# Patient Record
Sex: MALE | Race: WHITE | NOT HISPANIC OR LATINO | Employment: OTHER | URBAN - METROPOLITAN AREA
[De-identification: names, ages, dates, MRNs, and addresses within clinical notes are randomized per-mention and may not be internally consistent; named-entity substitution may affect disease eponyms.]

---

## 2023-09-11 ENCOUNTER — OFFICE VISIT (OUTPATIENT)
Dept: INTERNAL MEDICINE | Facility: CLINIC | Age: 43
End: 2023-09-11
Payer: OTHER GOVERNMENT

## 2023-09-11 VITALS
HEART RATE: 88 BPM | OXYGEN SATURATION: 96 % | RESPIRATION RATE: 16 BRPM | SYSTOLIC BLOOD PRESSURE: 110 MMHG | HEIGHT: 73 IN | BODY MASS INDEX: 27.55 KG/M2 | DIASTOLIC BLOOD PRESSURE: 82 MMHG | WEIGHT: 207.88 LBS

## 2023-09-11 DIAGNOSIS — F51.01 PRIMARY INSOMNIA: ICD-10-CM

## 2023-09-11 DIAGNOSIS — Z76.89 ENCOUNTER TO ESTABLISH CARE: Primary | ICD-10-CM

## 2023-09-11 DIAGNOSIS — G89.29 CHRONIC CERVICAL PAIN: ICD-10-CM

## 2023-09-11 DIAGNOSIS — R21 RASH AND NONSPECIFIC SKIN ERUPTION: ICD-10-CM

## 2023-09-11 DIAGNOSIS — M54.2 CHRONIC CERVICAL PAIN: ICD-10-CM

## 2023-09-11 DIAGNOSIS — L64.9 MALE PATTERN BALDNESS: ICD-10-CM

## 2023-09-11 DIAGNOSIS — Z72.0 NICOTINE USE: ICD-10-CM

## 2023-09-11 PROCEDURE — 99999 PR PBB SHADOW E&M-NEW PATIENT-LVL III: ICD-10-PCS | Mod: PBBFAC,,, | Performed by: INTERNAL MEDICINE

## 2023-09-11 PROCEDURE — 99203 PR OFFICE/OUTPT VISIT, NEW, LEVL III, 30-44 MIN: ICD-10-PCS | Mod: S$PBB,,, | Performed by: INTERNAL MEDICINE

## 2023-09-11 PROCEDURE — 99203 OFFICE O/P NEW LOW 30 MIN: CPT | Mod: S$PBB,,, | Performed by: INTERNAL MEDICINE

## 2023-09-11 PROCEDURE — 99999 PR PBB SHADOW E&M-NEW PATIENT-LVL III: CPT | Mod: PBBFAC,,, | Performed by: INTERNAL MEDICINE

## 2023-09-11 PROCEDURE — 99203 OFFICE O/P NEW LOW 30 MIN: CPT | Mod: PBBFAC | Performed by: INTERNAL MEDICINE

## 2023-09-11 RX ORDER — TRAZODONE HYDROCHLORIDE 50 MG/1
50 TABLET ORAL NIGHTLY
Qty: 30 TABLET | Refills: 11 | Status: SHIPPED | OUTPATIENT
Start: 2023-09-11 | End: 2024-09-10

## 2023-09-11 RX ORDER — BUPROPION HYDROCHLORIDE 150 MG/1
150 TABLET, EXTENDED RELEASE ORAL 2 TIMES DAILY
Qty: 60 TABLET | Refills: 11 | Status: SHIPPED | OUTPATIENT
Start: 2023-09-11 | End: 2024-09-10

## 2023-09-11 RX ORDER — FINASTERIDE 5 MG/1
5 TABLET, FILM COATED ORAL DAILY
COMMUNITY
End: 2023-09-18 | Stop reason: SDUPTHER

## 2023-09-11 RX ORDER — KETOCONAZOLE 20 MG/ML
SHAMPOO, SUSPENSION TOPICAL
COMMUNITY
End: 2023-09-18 | Stop reason: SDUPTHER

## 2023-09-11 NOTE — PROGRESS NOTES
"Subjective:       Patient ID: Cirilo Blackman is a 42 y.o. male.    Chief Complaint: Establish Care      HPI:  Patient is new to clinic and presents to establish care and for medication refills.    Hair loss: finasteride. Has been taking for years. No med side effects.   Ketoconazole shampoo. Had spots on trunk and "bumps" on scalp. This prevents the rash for reoccurring. Has had this for several years but this treatment is effective.     MRI neck and left shoulder pain. He has pain with turning head to the right. He is s/p left shoulder surgery 1.5 years ago. Still has intermittent pain. He has continued weakness of the left shoulder. He has reduced ROM. Reattach bicep tendon---continued weakness was expected outcome after surgery however.     Not sleeping well. Tried OTC melatonin without much success.   He can fall asleep ok but difficulty staying asleep. Wakes up a few times a night. Overall sleeping 5-6 hours a night.     Tobacco use: e cig with nicotine; smoking 5-6 cig/day  EtOH use: denies daily use  Illicit drug use: denies use      History reviewed. No pertinent past medical history.    Family History   Problem Relation Age of Onset    Cancer Father         prostate    Hypertension Father        Social History     Socioeconomic History    Marital status: Single   Tobacco Use    Smoking status: Every Day     Types: Vaping with nicotine     Start date: 9/9/2023    Smokeless tobacco: Never   Substance and Sexual Activity    Alcohol use: Yes     Alcohol/week: 3.0 standard drinks of alcohol     Types: 3 Cans of beer per week    Drug use: Never       Review of Systems   Constitutional:  Negative for activity change, fatigue, fever and unexpected weight change.   HENT:  Negative for congestion, ear pain, hearing loss, rhinorrhea and sore throat.    Eyes:  Negative for redness and visual disturbance.   Respiratory:  Negative for cough, shortness of breath and wheezing.    Cardiovascular:  Negative for chest pain, " palpitations and leg swelling.   Gastrointestinal:  Negative for abdominal pain, constipation, diarrhea, nausea and vomiting.   Genitourinary:  Negative for decreased urine volume, dysuria, frequency and urgency.   Musculoskeletal:  Negative for back pain, joint swelling and neck pain.   Skin:  Negative for color change, rash and wound.   Neurological:  Negative for dizziness, tremors, weakness, light-headedness and headaches.   Psychiatric/Behavioral:  Positive for sleep disturbance.          Objective:      Physical Exam  Vitals reviewed.   Constitutional:       General: He is not in acute distress.     Appearance: He is well-developed.   HENT:      Head: Normocephalic and atraumatic.      Right Ear: External ear normal.      Left Ear: External ear normal.   Eyes:      General:         Right eye: No discharge.         Left eye: No discharge.      Conjunctiva/sclera: Conjunctivae normal.   Neck:      Thyroid: No thyromegaly.   Cardiovascular:      Rate and Rhythm: Normal rate and regular rhythm.      Heart sounds: No murmur heard.  Pulmonary:      Effort: Pulmonary effort is normal. No respiratory distress.      Breath sounds: Normal breath sounds. No wheezing or rales.   Abdominal:      General: There is no distension.      Palpations: Abdomen is soft.      Tenderness: There is no abdominal tenderness.   Skin:     General: Skin is warm and dry.   Neurological:      Mental Status: He is alert and oriented to person, place, and time. Mental status is at baseline.   Psychiatric:         Behavior: Behavior normal.         Thought Content: Thought content normal.         Assessment:       1. Encounter to establish care    2. Nicotine use    3. Chronic cervical pain    4. Male pattern baldness    5. Rash and nonspecific skin eruption    6. Primary insomnia        Plan:       1. Encounter to establish care  Meds reconciled  Problem list reviewed and udpated  PMH, PSH, SH and  reviewed    2. Nicotine use  Smoking  cessation counseling done  Has had asuccess with wellbutrin in the past  Side effects discussed  Trial 150mg BID  -     buPROPion (WELLBUTRIN SR) 150 MG TBSR 12 hr tablet; Take 1 tablet (150 mg total) by mouth 2 (two) times daily.  Dispense: 60 tablet; Refill: 11    3. Chronic cervical pain  Had MRI done in past, will obtain radiology report to review  PRN tyelnol, NSAIDs    4. Male pattern baldness  Chronic stable  Cont meds same dose    5. Rash and nonspecific skin eruption  Chronic stable  Unclear actual diagnosis but responds well to nizoral shampoo; ok to continue for now    6. Primary insomnia  New problem  Failed melatonin  Trial of low dose trazodone. Side effects discussed today  Avoid hypnotics/controlled substance in [employer]  -     traZODone (DESYREL) 50 MG tablet; Take 1 tablet (50 mg total) by mouth every evening.  Dispense: 30 tablet; Refill: 11       RTC 3 months follow up meds and PRN

## 2023-09-11 NOTE — LETTER
AUTHORIZATION FOR RELEASE OF   CONFIDENTIAL INFORMATION    Dear Isabell,    We are seeing Cirilo Blackman, date of birth 1980, in the clinic at [facility]. Lazara Tirado MD is the patient's PCP. Cirilo Blackman has an outstanding lab/procedure at the time we reviewed his chart. In order to help keep his health information updated, he has authorized us to request the following medical record(s):        (  )  MAMMOGRAM                                      (  )  COLONOSCOPY      (  )  PAP SMEAR                                          (  )  OUTSIDE LAB RESULTS     (  )  DEXA SCAN                                          (  )  EYE EXAM            (  )  FOOT EXAM                                          ( X )  MRI SHOULDER     (  )  OUTSIDE IMMUNIZATIONS                 ( X )  MRI NECK         Please fax records to [facility], Knight, Sarah, MD, [phone]     If you have any questions, please contact Amie Kendall at [phone].           Patient Name: Cirilo Blackman  : 1980  Patient Phone #: [phone]

## 2023-09-15 RX ORDER — KETOCONAZOLE 20 MG/ML
SHAMPOO, SUSPENSION TOPICAL
Status: CANCELLED | OUTPATIENT
Start: 2023-09-18

## 2023-09-15 NOTE — TELEPHONE ENCOUNTER
Pt recently [facility] is asking for an RX for ketoconazole shampoo please advise.

## 2023-09-18 RX ORDER — KETOCONAZOLE 20 MG/ML
SHAMPOO, SUSPENSION TOPICAL
Qty: 120 ML | Refills: 1 | Status: SHIPPED | OUTPATIENT
Start: 2023-09-18 | End: 2023-11-20

## 2023-09-18 RX ORDER — KETOCONAZOLE 20 MG/ML
SHAMPOO, SUSPENSION TOPICAL
Qty: 120 ML | Refills: 3 | Status: CANCELLED | OUTPATIENT
Start: 2023-09-18

## 2023-09-18 RX ORDER — FINASTERIDE 5 MG/1
5 TABLET, FILM COATED ORAL DAILY
Qty: 30 TABLET | Refills: 11 | Status: SHIPPED | OUTPATIENT
Start: 2023-09-18

## 2023-09-18 NOTE — TELEPHONE ENCOUNTER
No care due was identified.  [facility] Embedded Care Due Messages. Reference number: [id number].   9/18/2023 9:09:22 AM CDT

## 2023-09-18 NOTE — TELEPHONE ENCOUNTER
----- Message from Lashae Perez sent at 2023  7:18 AM CDT -----  Contact: pt  Cirilo Blackman  MRN: [id number]  : 1980  PCP: Lazara Tirado  Home Phone      Not on file.  Work Phone      Not on file.  Mobile          [phone]      MESSAGE:     Pt left vm stating he was supposed to have 3 meds sent in to [facility] and they only received 1. He stated he is out of ketoconazole (NIZORAL) 2 % shampoo, finasteride (PROSCAR) 5 mg tablet. Left msg on        Please advise   [phone]

## 2023-09-26 ENCOUNTER — OFFICE VISIT (OUTPATIENT)
Dept: INTERNAL MEDICINE | Facility: CLINIC | Age: 43
End: 2023-09-26
Payer: OTHER GOVERNMENT

## 2023-09-26 VITALS
RESPIRATION RATE: 16 BRPM | HEART RATE: 82 BPM | BODY MASS INDEX: 27.14 KG/M2 | OXYGEN SATURATION: 95 % | SYSTOLIC BLOOD PRESSURE: 122 MMHG | WEIGHT: 204.81 LBS | HEIGHT: 73 IN | DIASTOLIC BLOOD PRESSURE: 80 MMHG

## 2023-09-26 DIAGNOSIS — M54.42 ACUTE LEFT-SIDED LOW BACK PAIN WITH LEFT-SIDED SCIATICA: ICD-10-CM

## 2023-09-26 DIAGNOSIS — Z09 HOSPITAL DISCHARGE FOLLOW-UP: Primary | ICD-10-CM

## 2023-09-26 PROCEDURE — 99999PBSHW FLU VACCINE (QUAD) GREATER THAN OR EQUAL TO 3YO PRESERVATIVE FREE IM: Mod: PBBFAC,,,

## 2023-09-26 PROCEDURE — 99213 PR OFFICE/OUTPT VISIT, EST, LEVL III, 20-29 MIN: ICD-10-PCS | Mod: S$PBB,,, | Performed by: INTERNAL MEDICINE

## 2023-09-26 PROCEDURE — 90686 IIV4 VACC NO PRSV 0.5 ML IM: CPT | Mod: PBBFAC

## 2023-09-26 PROCEDURE — 99214 OFFICE O/P EST MOD 30 MIN: CPT | Mod: PBBFAC | Performed by: INTERNAL MEDICINE

## 2023-09-26 PROCEDURE — 99999PBSHW FLU VACCINE (QUAD) GREATER THAN OR EQUAL TO 3YO PRESERVATIVE FREE IM: ICD-10-PCS | Mod: PBBFAC,,,

## 2023-09-26 PROCEDURE — 99999 PR PBB SHADOW E&M-EST. PATIENT-LVL IV: ICD-10-PCS | Mod: PBBFAC,,, | Performed by: INTERNAL MEDICINE

## 2023-09-26 PROCEDURE — 99999 PR PBB SHADOW E&M-EST. PATIENT-LVL IV: CPT | Mod: PBBFAC,,, | Performed by: INTERNAL MEDICINE

## 2023-09-26 PROCEDURE — 99213 OFFICE O/P EST LOW 20 MIN: CPT | Mod: S$PBB,,, | Performed by: INTERNAL MEDICINE

## 2023-09-26 NOTE — PROGRESS NOTES
"Subjective:       Patient ID: Cirilo Blackman is a 42 y.o. male.    Chief Complaint: Hospital Follow Up      HPI:  Patient is known to me and presents for ER follow up. He reports he was doing some heavy lifting and strenuous work and developed stiff lower back. He went for a massage and felt a "lightening blot" sensation down the left leg. When he tried to stand he almost fell due to weakness and pain. Went to ER and xray was normal. Given muscle relaxer and pain meds. Over last 3 days gradually improved in pain and strength. Yesterday he went into pool again for exercise, stretching.       Has chiropractor appointment with [facility].  He is interested in PT however.       History reviewed. No pertinent past medical history.    Family History   Problem Relation Age of Onset    Cancer Father         prostate    Hypertension Father        Social History     Socioeconomic History    Marital status: Single   Tobacco Use    Smoking status: Every Day     Types: Vaping with nicotine     Start date: 9/9/2023    Smokeless tobacco: Never   Substance and Sexual Activity    Alcohol use: Yes     Alcohol/week: 3.0 standard drinks of alcohol     Types: 3 Cans of beer per week    Drug use: Never       Review of Systems   Constitutional:  Negative for activity change, fatigue, fever and unexpected weight change.   HENT:  Negative for congestion, ear pain, hearing loss, rhinorrhea and sore throat.    Eyes:  Negative for pain, redness and visual disturbance.   Respiratory:  Negative for cough, shortness of breath and wheezing.    Cardiovascular:  Negative for chest pain, palpitations and leg swelling.   Gastrointestinal:  Negative for abdominal pain, constipation, diarrhea, nausea and vomiting.   Genitourinary:  Negative for decreased urine volume, dysuria, frequency and urgency.   Musculoskeletal:  Positive for back pain. Negative for joint swelling and neck pain.   Skin:  Negative for color change, " rash and wound.   Neurological:  Positive for numbness (shooting pain into left leg). Negative for dizziness, tremors, weakness, light-headedness and headaches.         Objective:      Physical Exam  Vitals reviewed.   Constitutional:       General: He is not in acute distress.     Appearance: He is well-developed.   HENT:      Head: Normocephalic and atraumatic.      Right Ear: External ear normal.      Left Ear: External ear normal.   Eyes:      General:         Right eye: No discharge.         Left eye: No discharge.      Conjunctiva/sclera: Conjunctivae normal.   Neck:      Thyroid: No thyromegaly.   Cardiovascular:      Rate and Rhythm: Normal rate and regular rhythm.   Pulmonary:      Effort: Pulmonary effort is normal. No respiratory distress.   Skin:     General: Skin is warm and dry.   Neurological:      Mental Status: He is alert and oriented to person, place, and time.   Psychiatric:         Behavior: Behavior normal.         Thought Content: Thought content normal.         Assessment:       1. Hospital discharge follow-up    2. Acute left-sided low back pain with left-sided sciatica        Plan:       1. Hospital discharge follow-up  Outside ER records requested and pending review  Meds reconciled    2. Acute left-sided low back pain with left-sided sciatica  New problem  Start PT  PRN tyelnol, NSAIDs  Avoid heavy lifting  If no improvement with conservative treatment will do MRI  -     Ambulatory referral/consult to Physical/Occupational Therapy; Future; Expected date: 10/03/2023

## 2023-11-20 RX ORDER — KETOCONAZOLE 20 MG/ML
1 SHAMPOO, SUSPENSION TOPICAL 2 TIMES DAILY
Qty: 120 ML | Refills: 1 | Status: SHIPPED | OUTPATIENT
Start: 2023-11-20

## 2024-01-23 ENCOUNTER — TELEPHONE (OUTPATIENT)
Dept: INTERNAL MEDICINE | Facility: CLINIC | Age: 44
End: 2024-01-23
Payer: OTHER GOVERNMENT

## 2024-01-23 ENCOUNTER — PATIENT OUTREACH (OUTPATIENT)
Dept: ADMINISTRATIVE | Facility: HOSPITAL | Age: 44
End: 2024-01-23
Payer: OTHER GOVERNMENT

## 2024-01-23 DIAGNOSIS — Z11.59 NEED FOR HEPATITIS C SCREENING TEST: ICD-10-CM

## 2024-01-23 DIAGNOSIS — Z13.220 SCREENING FOR HYPERLIPIDEMIA: ICD-10-CM

## 2024-01-23 DIAGNOSIS — Z11.4 SCREENING FOR HIV (HUMAN IMMUNODEFICIENCY VIRUS): ICD-10-CM

## 2024-01-23 DIAGNOSIS — Z13.1 DIABETES MELLITUS SCREENING: Primary | ICD-10-CM

## 2024-01-23 NOTE — TELEPHONE ENCOUNTER
----- Message from Jane Joyce LPN sent at 1/23/2024  2:05 PM CST -----  Regarding: Lab orders  Spoke with patient, Lab appointment scheduled for 02/06/2024.    Please add additional Labs if needed.    Thank you,  Jane Joyce LPN

## 2024-01-23 NOTE — PROGRESS NOTES
Population Health Chart Review & Patient Outreach Details    Outreach Performed: YES Telephone Successful    Additional Pop Health Notes:    Contacted patient to discuss Colorectal cancer screening and scheduling Lab appointment.   Patient will  Fit kit from office.  Lab appointment scheduled for 2024.       Updates Requested / Reviewed:      Updated Care Coordination Note, Care Everywhere, , External Sources: [facility] and [facility] [facility], Care Team Updated, Removed  or Duplicate Orders, and Immunizations Reconciliation Completed or Queried: [place]         Health Maintenance Topics Overdue:    Health Maintenance Due   Topic Date Due    Hepatitis C Screening  Never done    Lipid Panel  Never done    Pneumococcal Vaccines (Age 0-64) (1 - PCV) Never done    HIV Screening  Never done    Hemoglobin A1c (Diabetic Prevention Screening)  Never done    COVID-19 Vaccine (2023-24 season) 2023    Colorectal Cancer Screening  Never done         Health Maintenance Topic(s) Outreach Outcomes & Actions Taken:    Colorectal Cancer Screening - Outreach Outcomes & Actions Taken  : patient will stop by clinic to  Fit Kit.     Lab(s) - Outreach Outcomes & Actions Taken  : Overdue Lab(s) Ordered and Overdue Lab(s) Scheduled  Lab appointment scheduled for 2024.  Lab orders placed and linked to appointment. Message sent to provider for additional labs to be order if needed.

## 2024-02-06 ENCOUNTER — LAB VISIT (OUTPATIENT)
Dept: LAB | Facility: HOSPITAL | Age: 44
End: 2024-02-06
Attending: INTERNAL MEDICINE
Payer: OTHER GOVERNMENT

## 2024-02-06 DIAGNOSIS — Z11.59 NEED FOR HEPATITIS C SCREENING TEST: ICD-10-CM

## 2024-02-06 DIAGNOSIS — Z13.220 SCREENING FOR HYPERLIPIDEMIA: ICD-10-CM

## 2024-02-06 DIAGNOSIS — Z11.4 SCREENING FOR HIV (HUMAN IMMUNODEFICIENCY VIRUS): ICD-10-CM

## 2024-02-06 DIAGNOSIS — Z13.1 DIABETES MELLITUS SCREENING: ICD-10-CM

## 2024-02-06 LAB
CHOLEST SERPL-MCNC: 197 MG/DL (ref 120–199)
CHOLEST/HDLC SERPL: 3.9 {RATIO} (ref 2–5)
HDLC SERPL-MCNC: 51 MG/DL (ref 40–75)
HDLC SERPL: 25.9 % (ref 20–50)
LDLC SERPL CALC-MCNC: 127 MG/DL (ref 63–159)
NONHDLC SERPL-MCNC: 146 MG/DL
TRIGL SERPL-MCNC: 95 MG/DL (ref 30–150)

## 2024-02-06 PROCEDURE — 83036 HEMOGLOBIN GLYCOSYLATED A1C: CPT | Performed by: INTERNAL MEDICINE

## 2024-02-06 PROCEDURE — 86803 HEPATITIS C AB TEST: CPT | Performed by: INTERNAL MEDICINE

## 2024-02-06 PROCEDURE — 87389 HIV-1 AG W/HIV-1&-2 AB AG IA: CPT | Performed by: INTERNAL MEDICINE

## 2024-02-06 PROCEDURE — 80061 LIPID PANEL: CPT | Performed by: INTERNAL MEDICINE

## 2024-02-06 PROCEDURE — 36415 COLL VENOUS BLD VENIPUNCTURE: CPT | Performed by: INTERNAL MEDICINE

## 2024-02-07 LAB
ESTIMATED AVG GLUCOSE: 114 MG/DL (ref 68–131)
HBA1C MFR BLD: 5.6 % (ref 4–5.6)
HCV AB SERPL QL IA: NORMAL
HIV 1+2 AB+HIV1 P24 AG SERPL QL IA: NORMAL

## 2024-07-24 ENCOUNTER — HOSPITAL ENCOUNTER (OUTPATIENT)
Dept: RADIOLOGY | Facility: HOSPITAL | Age: 44
Discharge: HOME | End: 2024-07-24
Attending: INTERNAL MEDICINE
Payer: OTHER GOVERNMENT

## 2024-07-24 ENCOUNTER — TELEPHONE (OUTPATIENT)
Dept: INTERNAL MEDICINE | Facility: CLINIC | Age: 44
End: 2024-07-24

## 2024-07-24 ENCOUNTER — OFFICE VISIT (OUTPATIENT)
Dept: INTERNAL MEDICINE | Facility: CLINIC | Age: 44
End: 2024-07-24
Payer: OTHER GOVERNMENT

## 2024-07-24 ENCOUNTER — PATIENT MESSAGE (OUTPATIENT)
Dept: INTERNAL MEDICINE | Facility: CLINIC | Age: 44
End: 2024-07-24

## 2024-07-24 VITALS
RESPIRATION RATE: 16 BRPM | HEIGHT: 73 IN | OXYGEN SATURATION: 96 % | BODY MASS INDEX: 28.63 KG/M2 | WEIGHT: 216.06 LBS | HEART RATE: 86 BPM | DIASTOLIC BLOOD PRESSURE: 88 MMHG | SYSTOLIC BLOOD PRESSURE: 130 MMHG

## 2024-07-24 DIAGNOSIS — F51.01 PRIMARY INSOMNIA: ICD-10-CM

## 2024-07-24 DIAGNOSIS — G89.29 CHRONIC BILATERAL LOW BACK PAIN WITHOUT SCIATICA: ICD-10-CM

## 2024-07-24 DIAGNOSIS — M25.552 CHRONIC PAIN OF BOTH HIPS: Primary | ICD-10-CM

## 2024-07-24 DIAGNOSIS — M54.50 CHRONIC BILATERAL LOW BACK PAIN WITHOUT SCIATICA: ICD-10-CM

## 2024-07-24 DIAGNOSIS — S46.212S BICEPS TENDON RUPTURE, LEFT, SEQUELA: ICD-10-CM

## 2024-07-24 DIAGNOSIS — M54.2 CHRONIC NECK PAIN: ICD-10-CM

## 2024-07-24 DIAGNOSIS — M25.551 CHRONIC PAIN OF BOTH HIPS: Primary | ICD-10-CM

## 2024-07-24 DIAGNOSIS — M77.10 LATERAL EPICONDYLITIS, UNSPECIFIED LATERALITY: ICD-10-CM

## 2024-07-24 DIAGNOSIS — G89.29 CHRONIC NECK PAIN: ICD-10-CM

## 2024-07-24 DIAGNOSIS — J34.2 DEVIATED SEPTUM: ICD-10-CM

## 2024-07-24 DIAGNOSIS — H93.13 TINNITUS OF BOTH EARS: ICD-10-CM

## 2024-07-24 DIAGNOSIS — F41.1 GAD (GENERALIZED ANXIETY DISORDER): ICD-10-CM

## 2024-07-24 DIAGNOSIS — Z02.89 ENCOUNTER FOR PHYSICAL EXAMINATION RELATED TO EMPLOYMENT: Primary | ICD-10-CM

## 2024-07-24 DIAGNOSIS — G89.29 CHRONIC PAIN OF BOTH HIPS: Primary | ICD-10-CM

## 2024-07-24 PROBLEM — R21 RASH AND NONSPECIFIC SKIN ERUPTION: Status: RESOLVED | Noted: 2023-09-11 | Resolved: 2024-07-24

## 2024-07-24 PROCEDURE — 72050 X-RAY EXAM NECK SPINE 4/5VWS: CPT | Mod: 26,,, | Performed by: RADIOLOGY

## 2024-07-24 PROCEDURE — G2211 COMPLEX E/M VISIT ADD ON: HCPCS | Mod: S$PBB,,, | Performed by: INTERNAL MEDICINE

## 2024-07-24 PROCEDURE — 72100 X-RAY EXAM L-S SPINE 2/3 VWS: CPT | Mod: TC

## 2024-07-24 PROCEDURE — 72050 X-RAY EXAM NECK SPINE 4/5VWS: CPT | Mod: TC

## 2024-07-24 PROCEDURE — 99215 OFFICE O/P EST HI 40 MIN: CPT | Mod: S$PBB,,, | Performed by: INTERNAL MEDICINE

## 2024-07-24 PROCEDURE — 99214 OFFICE O/P EST MOD 30 MIN: CPT | Mod: PBBFAC,25 | Performed by: INTERNAL MEDICINE

## 2024-07-24 PROCEDURE — 72100 X-RAY EXAM L-S SPINE 2/3 VWS: CPT | Mod: 26,,, | Performed by: RADIOLOGY

## 2024-07-24 PROCEDURE — 99999 PR PBB SHADOW E&M-EST. PATIENT-LVL IV: CPT | Mod: PBBFAC,,, | Performed by: INTERNAL MEDICINE

## 2024-07-24 NOTE — PROGRESS NOTES
Subjective:       Patient ID: Cirilo Blackman is a 43 y.o. male.    Chief Complaint: Employment Physical      HPI:  Patient is known to me and presents for employment physical. Due for [facility] labs and spirometry. He also has several acute issues he would like to discuss today.       He is having right arm lateral elbow pain. Has had pain for a few years. Getting worse. Lots of frequent rotational movements at his job that he thinks has exacerbated the pain. He has no swelling, rash, skin changes.         Chronic neck and left shoulder pain: Had MRI neck and left shoulder pain prior to establishing here. He has pain with turning head to the right. He is s/p left shoulder surgery 1.5 years ago. Still has intermittent pain. He has continued weakness of the left shoulder. He has reduced ROM. Reattach bicep tendon---continued weakness was expected outcome after surgery however.    He is seeing chiropractor currently with some relief of shoulder pain.   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    He also has b/l knee and hip pain. Worse with running. Better with rest.   Doing swimming for exercise as running/walking is painful.  Likely due to wear and tear from chronic physical exertion with work.       Not sleeping well. Tried OTC melatonin without much success.   He can fall asleep ok but difficulty staying asleep. Wakes up a few times a night. Overall sleeping 5-6 hours a night.   At last visit we started trazodone 50mg. He does find it is effective. He does not take every night.  He is still snoring. Snoring does not seem to be improved with position change (sitting up for example).  He does have obvious deviated septum; can not breath from right nostril.       He is also asking about counseling. He is having anxiety and depression sx. He relates this to leaving  [place] and anxiety of unknown future.  Denies SI/HI.   He is interested in counseling.     He lastly report b/l tinnitus. Has constant ringing senation in the ears. At this time, doesn't appreciate hearing loss. Loud noise exposure from .      Hair loss: finasteride. Has been taking for years. No med side effects.   Ketoconazole shampoo. Had spots on trunk and "bumps" on scalp. This prevents the rash for reoccurring. Has had this for several years but this treatment is effective.     Tobacco use: e cig with nicotine; smoking 5-6 cig/day  EtOH use: denies daily use  Illicit drug use: denies use         History reviewed. No pertinent past medical history.    Family History   Problem Relation Name Age of Onset    Cancer Father          prostate    Hypertension Father         Social History     Socioeconomic History    Marital status: Single   Tobacco Use    Smoking status: Every Day     Types: Vaping with nicotine     Start date: 9/9/2023    Smokeless tobacco: Never   Substance and Sexual Activity    Alcohol use: Yes     Alcohol/week: 3.0 standard drinks of alcohol     Types: 3 Cans of beer per week    Drug use: Never     Social Determinants of Health     Transportation Needs: No Transportation Needs (1/23/2024)    PRAPARE - Transportation     Lack of Transportation (Medical): No     Lack of Transportation (Non-Medical): No       Review of Systems   Constitutional:  Negative for activity change, fatigue, fever and unexpected weight change.   HENT:  Positive for tinnitus. Negative for congestion, ear pain, rhinorrhea and sore throat.    Eyes:  Negative for redness and visual disturbance.   Respiratory:  Negative for cough, shortness of breath and wheezing.    Cardiovascular:  Negative for chest pain, palpitations and leg swelling.   Gastrointestinal:  Negative for abdominal pain, constipation, diarrhea, nausea and vomiting.   Genitourinary:  Negative for dysuria and frequency.   Musculoskeletal:  Positive " for arthralgias, back pain and neck pain. Negative for joint swelling.   Skin:  Negative for color change, rash and wound.   Neurological:  Negative for dizziness, light-headedness and headaches.   Psychiatric/Behavioral:  Positive for sleep disturbance. The patient is nervous/anxious.          Objective:      Physical Exam  Vitals reviewed.   Constitutional:       General: He is not in acute distress.     Appearance: He is well-developed.   HENT:      Head: Normocephalic and atraumatic.      Right Ear: External ear normal.      Left Ear: External ear normal.      Nose:      Comments: Deviated septum  Eyes:      General:         Right eye: No discharge.         Left eye: No discharge.      Conjunctiva/sclera: Conjunctivae normal.   Neck:      Thyroid: No thyromegaly.   Cardiovascular:      Rate and Rhythm: Normal rate and regular rhythm.      Heart sounds: No murmur heard.  Pulmonary:      Effort: Pulmonary effort is normal. No respiratory distress.      Breath sounds: Normal breath sounds.   Abdominal:      General: There is no distension.      Palpations: Abdomen is soft.      Tenderness: There is no abdominal tenderness.   Musculoskeletal:         General: Tenderness (right latearl epicondyle; pain wtih supination/pronation) and deformity (right 5th digit with mild deformity after possible injury) present.   Skin:     General: Skin is warm and dry.   Neurological:      Mental Status: He is alert and oriented to person, place, and time.   Psychiatric:         Behavior: Behavior normal.         Thought Content: Thought content normal.         Assessment:       1. Encounter for physical examination related to employment    2. MINOO (generalized anxiety disorder)    3. Deviated septum    4. Lateral epicondylitis, unspecified laterality    5. Biceps tendon rupture, left, sequela    6. Tinnitus of both ears    7. Chronic neck pain    8. Chronic bilateral low back pain without sciatica    9. Primary insomnia        Plan:        1. Encounter for physical examination related to employment  Labs and PFTs ordered per [facility] physical  Return for completion of forms when all tseting complete  -     CBC Auto Differential; Future; Expected date: 07/24/2024  -     Comprehensive Metabolic Panel; Future; Expected date: 07/24/2024  -     TSH; Future; Expected date: 07/24/2024  -     Lipid Panel; Future; Expected date: 07/24/2024  -     T4, Free; Future; Expected date: 07/24/2024  -     Hemoglobin A1C; Future; Expected date: 07/24/2024  -     Urinalysis; Future; Expected date: 07/24/2024  -     Complete PFT with bronchodilator; Future  -     LACTATE DEHYDROGENASE; Future; Expected date: 07/24/2024    2. MINOO (generalized anxiety disorder)  Chronic uncontrolled  Would like to start therapy, agree with patient request  -     Ambulatory referral/consult to Psychology; Future; Expected date: 07/31/2024    3. Deviated septum  Chronic, not improving  + snoring  + difficulty breathing  To ENT to discuss treatmen toptions  -     Ambulatory referral/consult to ENT; Future; Expected date: 07/31/2024    4. Lateral epicondylitis, unspecified laterality  Acute  Rest when able   PRN tyelnol, NSAIDs    5. Biceps tendon rupture, left, sequela  History of  Continued weakness post operatively  Recurrent use with work  PRN tyelnol, nsaids    6. Tinnitus of both ears  New problem  To ENT for hearing testing  -     Ambulatory referral/consult to ENT; Future; Expected date: 07/31/2024    7. Chronic neck pain  Chronic, not improved  Still with some pain with rotation  Update xray  Lots of overuse, wear and tear injury from employment  PRN tyelnol, nsaids    -     X-Ray Cervical Spine Complete 5 view; Future; Expected date: 07/24/2024    8. Chronic bilateral low back pain without sciatica  Chronic, not improved  Update xray  Lots of overuse, wear and tear injury from employment  PRN tyelnol, nsaids  -     X-Ray Lumbar Spine Ap And Lateral; Future; Expected date:  07/24/2024    9. Primary insomnia  Chronic stable  Cont trazodone same dose  ENT for deviated septum/snoring       RTC as mani and PRN

## 2024-07-26 ENCOUNTER — HOSPITAL ENCOUNTER (OUTPATIENT)
Dept: RADIOLOGY | Facility: HOSPITAL | Age: 44
Discharge: HOME | End: 2024-07-26
Attending: INTERNAL MEDICINE
Payer: OTHER GOVERNMENT

## 2024-07-26 ENCOUNTER — PATIENT MESSAGE (OUTPATIENT)
Dept: INTERNAL MEDICINE | Facility: CLINIC | Age: 44
End: 2024-07-26

## 2024-07-26 ENCOUNTER — HOSPITAL ENCOUNTER (OUTPATIENT)
Dept: PULMONOLOGY | Facility: HOSPITAL | Age: 44
Discharge: HOME | End: 2024-07-26
Attending: INTERNAL MEDICINE
Payer: OTHER GOVERNMENT

## 2024-07-26 DIAGNOSIS — G89.29 CHRONIC PAIN OF BOTH HIPS: ICD-10-CM

## 2024-07-26 DIAGNOSIS — M25.551 CHRONIC PAIN OF BOTH HIPS: ICD-10-CM

## 2024-07-26 DIAGNOSIS — Z02.89 ENCOUNTER FOR PHYSICAL EXAMINATION RELATED TO EMPLOYMENT: ICD-10-CM

## 2024-07-26 DIAGNOSIS — M25.552 CHRONIC PAIN OF BOTH HIPS: ICD-10-CM

## 2024-07-26 PROCEDURE — 73521 X-RAY EXAM HIPS BI 2 VIEWS: CPT | Mod: TC

## 2024-07-26 PROCEDURE — 94727 GAS DIL/WSHOT DETER LNG VOL: CPT

## 2024-07-26 PROCEDURE — 94729 DIFFUSING CAPACITY: CPT

## 2024-07-26 PROCEDURE — 99900035 HC TECH TIME PER 15 MIN (STAT)

## 2024-07-26 PROCEDURE — 73521 X-RAY EXAM HIPS BI 2 VIEWS: CPT | Mod: 26,,, | Performed by: RADIOLOGY

## 2024-07-26 PROCEDURE — 94010 BREATHING CAPACITY TEST: CPT

## 2024-07-29 LAB
DLCO SINGLE BREATH LLN: 27.71
DLCO SINGLE BREATH PRE REF: 96.8 %
DLCO SINGLE BREATH REF: 34.64
DLCOC SBVA LLN: 3.36
DLCOC SBVA REF: 4.48
DLCOC SINGLE BREATH LLN: 27.71
DLCOC SINGLE BREATH REF: 34.64
DLCOVA LLN: 3.36
DLCOVA PRE REF: 98.6 %
DLCOVA PRE: 4.42 ML/(MIN*MMHG*L)
DLCOVA REF: 4.48
ERVN2 LLN: -16448.54
ERVN2 PRE REF: 109.3 %
ERVN2 PRE: 1.6 L
ERVN2 REF: 1.46
FEF 25 75 LLN: 2.65
FEF 25 75 PRE REF: 104.2 %
FEF 25 75 REF: 4.36
FEV1 FVC LLN: 69
FEV1 FVC PRE REF: 100.7 %
FEV1 FVC REF: 79
FEV1 LLN: 3.51
FEV1 PRE REF: 108.1 %
FEV1 REF: 4.45
FRCN2 LLN: 2.67
FRCN2 PRE REF: 88.5 %
FRCN2 REF: 3.65
FVC LLN: 4.46
FVC PRE REF: 106.7 %
FVC REF: 5.65
IVC PRE: 4.69 L
IVC SINGLE BREATH LLN: 4.46
IVC SINGLE BREATH PRE REF: 83 %
IVC SINGLE BREATH REF: 5.65
MEP LLN: 83
MEP PRE REF: 113.4 %
MEP PRE: 113.4 CMH2O
MEP REF: 100
MIP LLN: 58
MIP PRE REF: 198.4 %
MIP PRE: 148.78 CMH2O
MIP REF: 75
MVV LLN: 142
MVV PRE REF: 92 %
MVV REF: 167
PEF LLN: 8.2
PEF PRE REF: 83.7 %
PEF REF: 10.72
PRE DLCO: 33.54 ML/(MIN*MMHG)
PRE FEF 25 75: 4.54 L/S
PRE FET 100: 6.56 SEC
PRE FEV1 FVC: 79.78 %
PRE FEV1: 4.81 L
PRE FRC N2: 3.23 L
PRE FVC: 6.03 L
PRE MVV: 153.75 L/MIN
PRE PEF: 8.97 L/S
RVN2 LLN: 1.51
RVN2 PRE REF: 74.6 %
RVN2 PRE: 1.63 L
RVN2 REF: 2.19
RVN2TLCN2 LLN: 22.53
RVN2TLCN2 PRE REF: 66.7 %
RVN2TLCN2 PRE: 21.01 %
RVN2TLCN2 REF: 31.51
TLCN2 LLN: 6.58
TLCN2 PRE REF: 100.4 %
TLCN2 PRE: 7.77 L
TLCN2 REF: 7.74
VA PRE: 7.59 L
VA SINGLE BREATH LLN: 7.59
VA SINGLE BREATH PRE REF: 100.1 %
VA SINGLE BREATH REF: 7.59
VCMAXN2 LLN: 4.46
VCMAXN2 PRE REF: 108.5 %
VCMAXN2 PRE: 6.14 L
VCMAXN2 REF: 5.65

## 2024-08-01 PROBLEM — Z02.89 ENCOUNTER FOR PHYSICAL EXAMINATION RELATED TO EMPLOYMENT: Status: ACTIVE | Noted: 2024-08-01

## 2024-08-09 ENCOUNTER — HOSPITAL ENCOUNTER (OUTPATIENT)
Dept: RADIOLOGY | Facility: HOSPITAL | Age: 44
Discharge: HOME | End: 2024-08-09
Attending: INTERNAL MEDICINE
Payer: OTHER GOVERNMENT

## 2024-08-09 ENCOUNTER — OFFICE VISIT (OUTPATIENT)
Dept: INTERNAL MEDICINE | Facility: CLINIC | Age: 44
End: 2024-08-09
Payer: OTHER GOVERNMENT

## 2024-08-09 VITALS
SYSTOLIC BLOOD PRESSURE: 116 MMHG | HEART RATE: 87 BPM | OXYGEN SATURATION: 97 % | RESPIRATION RATE: 16 BRPM | HEIGHT: 73 IN | WEIGHT: 214.06 LBS | DIASTOLIC BLOOD PRESSURE: 74 MMHG | BODY MASS INDEX: 28.37 KG/M2

## 2024-08-09 DIAGNOSIS — Z76.0 MEDICATION REFILL: ICD-10-CM

## 2024-08-09 DIAGNOSIS — M54.2 CHRONIC CERVICAL PAIN: ICD-10-CM

## 2024-08-09 DIAGNOSIS — G89.29 CHRONIC PAIN OF BOTH KNEES: ICD-10-CM

## 2024-08-09 DIAGNOSIS — G89.29 CHRONIC LEFT SHOULDER PAIN: ICD-10-CM

## 2024-08-09 DIAGNOSIS — M25.561 CHRONIC PAIN OF BOTH KNEES: ICD-10-CM

## 2024-08-09 DIAGNOSIS — M25.512 CHRONIC LEFT SHOULDER PAIN: ICD-10-CM

## 2024-08-09 DIAGNOSIS — J34.2 DEVIATED SEPTUM: Primary | ICD-10-CM

## 2024-08-09 DIAGNOSIS — M25.562 CHRONIC PAIN OF BOTH KNEES: ICD-10-CM

## 2024-08-09 DIAGNOSIS — G89.29 CHRONIC CERVICAL PAIN: ICD-10-CM

## 2024-08-09 DIAGNOSIS — L64.9 MALE PATTERN BALDNESS: ICD-10-CM

## 2024-08-09 DIAGNOSIS — M25.552 BILATERAL HIP PAIN: ICD-10-CM

## 2024-08-09 DIAGNOSIS — M25.551 BILATERAL HIP PAIN: ICD-10-CM

## 2024-08-09 DIAGNOSIS — F51.01 PRIMARY INSOMNIA: ICD-10-CM

## 2024-08-09 DIAGNOSIS — M77.8 RIGHT ELBOW TENDINITIS: ICD-10-CM

## 2024-08-09 DIAGNOSIS — H93.19 TINNITUS, UNSPECIFIED LATERALITY: ICD-10-CM

## 2024-08-09 PROCEDURE — 73560 X-RAY EXAM OF KNEE 1 OR 2: CPT | Mod: TC,50

## 2024-08-09 PROCEDURE — 73560 X-RAY EXAM OF KNEE 1 OR 2: CPT | Mod: 26,50,, | Performed by: RADIOLOGY

## 2024-08-09 PROCEDURE — 73030 X-RAY EXAM OF SHOULDER: CPT | Mod: 26,LT,, | Performed by: RADIOLOGY

## 2024-08-09 PROCEDURE — 73030 X-RAY EXAM OF SHOULDER: CPT | Mod: TC,LT

## 2024-08-09 PROCEDURE — 99215 OFFICE O/P EST HI 40 MIN: CPT | Mod: PBBFAC | Performed by: INTERNAL MEDICINE

## 2024-08-09 PROCEDURE — 99999 PR PBB SHADOW E&M-EST. PATIENT-LVL V: CPT | Mod: PBBFAC,,, | Performed by: INTERNAL MEDICINE

## 2024-08-09 RX ORDER — KETOCONAZOLE 20 MG/ML
1 SHAMPOO, SUSPENSION TOPICAL 2 TIMES DAILY
Qty: 120 ML | Refills: 1 | Status: SHIPPED | OUTPATIENT
Start: 2024-08-09

## 2024-08-13 ENCOUNTER — CLINICAL SUPPORT (OUTPATIENT)
Dept: REHABILITATION | Facility: HOSPITAL | Age: 44
End: 2024-08-13
Attending: INTERNAL MEDICINE
Payer: OTHER GOVERNMENT

## 2024-08-13 DIAGNOSIS — G89.29 CHRONIC PAIN OF BOTH KNEES: ICD-10-CM

## 2024-08-13 DIAGNOSIS — M77.8 ELBOW TENDINITIS: Primary | ICD-10-CM

## 2024-08-13 DIAGNOSIS — M25.561 CHRONIC PAIN OF BOTH KNEES: ICD-10-CM

## 2024-08-13 DIAGNOSIS — M25.562 CHRONIC PAIN OF BOTH KNEES: ICD-10-CM

## 2024-08-13 PROCEDURE — 97161 PT EVAL LOW COMPLEX 20 MIN: CPT | Mod: PN

## 2024-08-13 PROCEDURE — 97110 THERAPEUTIC EXERCISES: CPT | Mod: PN

## 2024-08-13 NOTE — PLAN OF CARE
"[facility]   Physical Therapy Initial Evaluation     Date: 8/13/2024   Name: Cirilo Blackman  Clinic Number: [id number]    Therapy Diagnosis:   Encounter Diagnosis   Name Primary?    Chronic pain of both knees      Physician: Lazara Tirado MD    Physician Orders: PT Eval and Treat   Medical Diagnosis from Referral:   Diagnosis   M25.561,M25.562,G89.29 (ICD-10-CM) - Chronic pain of both knees     Evaluation Date: 8/13/2024  Authorization Period Expiration: 8/9/2025  Plan of Care Expiration: 9/24/2024  Progress Note Due: visit 4  Visit # / Visits authorized: 1/ 1   FOTO: 1/3    Precautions: Standard     Time In: 0945  Time Out: 1030  Total Appointment Time (timed & untimed codes): 45 minutes      SUBJECTIVE     Date of onset: knee pain about 3 years; back pain about 3-4 years; elbow about a year    History of current condition - Cirilo reports: elbow is hurting more than today than his knee. Has continued back pain as well. Knee pain started since he ran his marathon. After running a marathon, he experienced burning afterwards and noticed his pain then. He is currently unable to run. Patient has more pain in the right knee compared to his left. Left ankle doesn't give him much discomfort or pain since his surgery. Left shoulder feels like it is weak and may "pop" out. Elbow has been the most recent injury which he reports is his biggest problem currently.    Falls: none    Imaging, bone scan films: FINDINGS:  Right: There is an intramedullary solis with proximal screws is seen within the right tibia, not fully evaluated.  Joint spaces are well maintained.  No fracture or dislocation is seen.  No joint effusion.     Left: Joint spaces are well maintained.  No fracture or dislocation.  No joint effusion.     Impression:     As above.    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    Surgical History:   Cirilo Blackman  has a past surgical history that includes Wrist surgery (Left); Ankle fracture surgery (Left); Fracture surgery (Right); and Shoulder surgery (Left).    Medications:   Cirilo has a current medication list which includes the following prescription(s): bupropion, finasteride, ketoconazole, and trazodone.    Allergies:   Review of patient's allergies indicates:  No Known Allergies     OBJECTIVE     LOWER EXTREMITY AROM    LEFT RIGHT   Knee Flexion WNL WNL   Knee Extension WNL WNL   Dorsiflexion     Plantarflexion         LOWER EXTREMITY STRENGTH    LEFT RIGHT   Knee Extension 4/5 4-/5   Knee Flexion 4-/5 4-/5   Discomfort and pain with resisted knee flexion.    FLEXIBILITY   Hamstring  Moderate limitation bilateral   Prone Quadriceps Moderate limitation bilateral       DERMATOMES: Sensation: Light Touch: Intact  MYOTOMES: WNL    SPECIAL TESTS    LEFT RIGHT   Jointline Tenderness - -   Hip MARKO + +     PALPATION:  Patient reports primary pain region along patella with compression    STAIRS: aggravates symptoms    SQUAT: The pt demonstrates bilateral knee valgus with anterior tibial translation noted.     PATELLAR TRACKING: increased lateral tracking B     Limitation/Restriction for FOTO Knee Survey    Therapist reviewed FOTO scores for Cirilo Blackman on 8/13/2024.   FOTO documents entered into [employer] - see Media section.    Limitation Score: 46%       TREATMENT     Total Treatment time (time-based codes) separate from Evaluation: 25 minutes      Cirilo received the treatments listed  below:      therapeutic exercises to develop flexibility for 10 minutes including:  -3 x 30 seconds gastroc ea.  -3 x 30 seconds hamstring ea with strap      manual therapy techniques:  were applied to the:  for  minutes, including:      neuromuscular re-education activities to improve:  for  minutes. The following activities were included:      therapeutic activities to improve functional performance for   minutes, including:        hot pack for  minutes to .    cold pack for  minutes to .      PATIENT EDUCATION AND HOME EXERCISES     Education provided:   - Stretching program  - Pt/family was provided educational information, including: role of PT, goals for PT, scheduling - pt verbalized understanding. Discussed insurance limitations with pt.     Written Home Exercises Provided: yes. Exercises were reviewed and Cirilo was able to demonstrate them prior to the end of the session.  Cirilo demonstrated good  understanding of the education provided. See EMR under Patient Instructions for exercises provided during therapy sessions.    ASSESSMENT     Cirilo is a 43 y.o. male referred to outpatient Physical Therapy with a medical diagnosis of   Diagnosis   M25.561,M25.562,G89.29 (ICD-10-CM) - Chronic pain of both knees   . Patient presents with decreased hip mobility and flexibility, impaired B LE strength and decreased B LE endurance. Patient would benefit from skilled physical therapy to address the above mentioned deficits.    -Decreased function (LEFS)  -Increased pain (NPS)   -Decreased pain free knee AROM  -Decreased LE strength (MMT)   -Decreased LE mobility   -Decreased participation in regular exercise routine  -(+) TTP    Intervention strategies designed to address these impairments will be instrumental in achieving the stated functional goals, including her ability to safely perform mobility tasks. Based on the examination, the patient's rehabilitation potential to achieve functional goals is  good.    Patient prognosis is Good.   Patient will benefit from skilled outpatient Physical Therapy to address the deficits stated above and in the chart below, provide patient /family education, and to maximize patientt's level of independence.     Plan of care discussed with patient: Yes  Patient's spiritual, cultural and educational needs considered and patient is agreeable to the plan of care and goals as stated below:     Anticipated Barriers for therapy: none    Medical Necessity is demonstrated by the following  History  Co-morbidities and personal factors that may impact the plan of care Co-morbidities:   anxiety    Personal Factors:   no deficits     low   Examination  Body Structures and Functions, activity limitations and participation restrictions that may impact the plan of care Body Regions:   lower extremities    Body Systems:    gross symmetry  ROM  strength         low   Clinical Presentation stable and uncomplicated low   Decision Making/ Complexity Score: low     Goals:  Short Term Goals: 2 weeks   1. Patient demonstrates independence with HEP.   2. Patient to improve B hamstring flexibility to moderate restrictions.    Long Term Goals: 4 weeks   1. Patient demonstrates increased B hip flexibility to minimal restrictions to improve tolerance to functional activities.   2. Patient demonstrates increased strength BLE's to 4+/5 or greater to improve tolerance to functional activities.   3. Patient demonstrates improved overall function per KOOS, Jr. to 58% or more.       PLAN   Plan of care Certification: 8/13/2024 to 9/24/2024.    Outpatient Physical Therapy 2 times weekly for 4 weeks to include the following interventions: Electrical Stimulation  , Manual Therapy, Moist Heat/ Ice, Neuromuscular Re-ed, Patient Education, Therapeutic Activities, and Therapeutic Exercise.     Yesenia Aburto, PT    Pt may be seen by PTA as part of the rehabilitation team.     Therapist: Yesenia Aburto PT  8/13/2024    I  CERTIFY THE NEED FOR THESE SERVICES FURNISHED UNDER THIS PLAN OF TREATMENT AND WHILE UNDER MY CARE   Physician's comments:     Physician's Signature: ___________________________________________________

## 2024-08-13 NOTE — Clinical Note
Good afternoon, Mr. Kerr does need an occupational therapy order for lateral epicondylitis.   Thanks, Charity

## 2024-08-14 NOTE — PROGRESS NOTES
"Message received:  "Good afternoon,   Mr. Kerr does need an occupational therapy order for lateral epicondylitis.     Thanks,   Charity "      OT referral placed.   "

## 2024-08-15 ENCOUNTER — TELEPHONE (OUTPATIENT)
Dept: INTERNAL MEDICINE | Facility: CLINIC | Age: 44
End: 2024-08-15
Payer: OTHER GOVERNMENT

## 2024-08-15 NOTE — TELEPHONE ENCOUNTER
Referral to ENT has been approved through [facility]. Auth # [id number] (additional information is documented on the referral).   Referral, clinic notes, insurance and demographic information faxed to Dr. Abundio Wilson's office.

## 2024-08-19 ENCOUNTER — TELEPHONE (OUTPATIENT)
Dept: INTERNAL MEDICINE | Facility: CLINIC | Age: 44
End: 2024-08-19
Payer: OTHER GOVERNMENT

## 2024-08-19 NOTE — TELEPHONE ENCOUNTER
----- Message from Helena Chapa sent at 2024  2:42 PM CDT -----  Contact: pt  Cirilo Blackman  MRN: [id number]  : 1980  PCP: Lazara Tirado  Home Phone      [phone]  Work Phone      Not on file.  Mobile          [phone]      MESSAGE: the pt states the Referral to the ENT sent in, needs to be sent somewhere else due to the fact they do not take his insurance. Pt also states the PT therapist told him, he needed a referral to a joint specialist for his shoulder and Arm. The therapist states they don't do anything for his bone spur. Please advise       [phone]

## 2024-08-20 ENCOUNTER — TELEPHONE (OUTPATIENT)
Dept: INTERNAL MEDICINE | Facility: CLINIC | Age: 44
End: 2024-08-20
Payer: OTHER GOVERNMENT

## 2024-08-20 DIAGNOSIS — M25.519 ACUTE SHOULDER PAIN, UNSPECIFIED LATERALITY: Primary | ICD-10-CM

## 2024-08-20 NOTE — TELEPHONE ENCOUNTER
Patient physical therapist state's patient needs referral for joint specialist for shoulder and arm. Please advise.

## 2024-08-20 NOTE — TELEPHONE ENCOUNTER
Not sure what he is referring to. Ortho referral? If this is just for the spur noted on the xray that is not why I sent him to PT. I sent him there to assist with pain control, muscle strength ing. PT will not correct the spur but if the pain and function is corrected surgery with ortho is not needed.    Is there something else he is needing that I ma not aware of? I didn't see anything in the PT notes.

## 2024-08-20 NOTE — TELEPHONE ENCOUNTER
Approval though [facility] and faxed referral ENT Davey Goss [phone]. Patient notified V/U.

## 2024-08-20 NOTE — TELEPHONE ENCOUNTER
Spoke to patient about message; state's still wants to see an orthopedic doctor for left shoulder pain and right forearm pain. Please advise.

## 2024-08-22 ENCOUNTER — TELEPHONE (OUTPATIENT)
Dept: ORTHOPEDICS | Facility: CLINIC | Age: 44
End: 2024-08-22
Payer: OTHER GOVERNMENT

## 2024-08-22 ENCOUNTER — CLINICAL SUPPORT (OUTPATIENT)
Dept: REHABILITATION | Facility: HOSPITAL | Age: 44
End: 2024-08-22
Attending: INTERNAL MEDICINE
Payer: OTHER GOVERNMENT

## 2024-08-22 DIAGNOSIS — G89.29 CHRONIC PAIN OF BOTH KNEES: Primary | ICD-10-CM

## 2024-08-22 DIAGNOSIS — M25.562 CHRONIC PAIN OF BOTH KNEES: Primary | ICD-10-CM

## 2024-08-22 DIAGNOSIS — M25.561 CHRONIC PAIN OF BOTH KNEES: Primary | ICD-10-CM

## 2024-08-22 PROCEDURE — 97530 THERAPEUTIC ACTIVITIES: CPT | Mod: PN

## 2024-08-22 PROCEDURE — 97112 NEUROMUSCULAR REEDUCATION: CPT | Mod: PN

## 2024-08-22 PROCEDURE — 97110 THERAPEUTIC EXERCISES: CPT | Mod: PN

## 2024-08-22 NOTE — PROGRESS NOTES
[facility]   Physical Therapy Treatment Note      Name: Cirilo Blackman  Clinic Number: [id number]    Therapy Diagnosis:   Encounter Diagnosis   Name Primary?    Chronic pain of both knees Yes     Physician: Lazara Tirado MD    Visit Date: 8/22/2024       Physician Orders: PT Eval and Treat   Medical Diagnosis from Referral:   Diagnosis   M25.561,M25.562,G89.29 (ICD-10-CM) - Chronic pain of both knees      Evaluation Date: 8/13/2024  Authorization Period Expiration: 8/9/2025  Plan of Care Expiration: 9/24/2024  Progress Note Due: visit 4  Visit # / Visits authorized: 2/ 8  FOTO: 1/3     Precautions: Standard      Time In: 0845  Time Out: 0925  Total Appointment Time (timed & untimed codes): 40 minutes    PTA Visit #: -/5       Subjective     Patient reports: his knees have been a little achy since his first visit.  He was compliant with home exercise program.  Response to previous treatment: positive  Functional change: minimal change    Pain: 4/10  Location: bilateral knee      Objective      Objective Measures updated at progress report unless specified.     Treatment     Cirilo received the treatments listed below:      therapeutic exercises to develop strength, endurance, ROM, and flexibility for 25 minutes including:  -2 x 30 seconds quad stretch ea.  -2 x 30 seconds hamstring ea with strap  -5 min recumbent bike level 5  -2 x 30 seconds piriformis stretch  -2 x 10 hip extension green theraband ea leg  -2 x 10 hip abduction green theraband ea. leg      manual therapy techniques:  were applied to the:  for  minutes, including:      neuromuscular re-education activities to improve:  for 5 minutes. The following activities were included:  -2 x 10 SLS step up on blue jossy pad ea. leg    therapeutic activities to improve functional performance for 10  minutes, including:  -shuttle squats 2 x 10 3 black/1 red  -shuttle heel raises 2 x 10 3 black/1 red    hot pack for  minutes to .    cold  pack for  minutes to .    Patient Education and Home Exercises       Education provided:   - Cont HEP    Written Home Exercises Provided: Patient instructed to cont prior HEP. Exercises were reviewed and Cirilo was able to demonstrate them prior to the end of the session.  Cirilo demonstrated good  understanding of the education provided. See Electronic Medical Record under Patient Instructions for exercises provided during therapy sessions    Assessment     Cirilo tolerated increased there ex for hip strengthening and stretching well. Patient with no increased symptoms with P.T. at this time.    Cirilo Is progressing well towards his goals.   Patient prognosis is Good.     Patient will continue to benefit from skilled outpatient physical therapy to address the deficits listed in the problem list box on initial evaluation, provide pt/family education and to maximize pt's level of independence in the home and community environment.     Patient's spiritual, cultural and educational needs considered and pt agreeable to plan of care and goals.     Anticipated barriers to physical therapy: none    Goals: Short Term Goals: 2 weeks   1. Patient demonstrates independence with HEP.   2. Patient to improve B hamstring flexibility to moderate restrictions.     Long Term Goals: 4 weeks   1. Patient demonstrates increased B hip flexibility to minimal restrictions to improve tolerance to functional activities.   2. Patient demonstrates increased strength BLE's to 4+/5 or greater to improve tolerance to functional activities.   3. Patient demonstrates improved overall function per KOOS, Jr. to 58% or more.         PLAN   Plan of care Certification: 8/13/2024 to 9/24/2024.     Outpatient Physical Therapy 2 times weekly for 4 weeks to include the following interventions: Electrical Stimulation  , Manual Therapy, Moist Heat/ Ice, Neuromuscular Re-ed, Patient Education, Therapeutic Activities, and Therapeutic Exercise.       Yesenia  Lamonte, PT

## 2024-08-23 ENCOUNTER — OFFICE VISIT (OUTPATIENT)
Dept: ORTHOPEDICS | Facility: CLINIC | Age: 44
End: 2024-08-23
Payer: OTHER GOVERNMENT

## 2024-08-23 ENCOUNTER — HOSPITAL ENCOUNTER (OUTPATIENT)
Dept: RADIOLOGY | Facility: HOSPITAL | Age: 44
Discharge: HOME | End: 2024-08-23
Attending: PHYSICIAN ASSISTANT
Payer: OTHER GOVERNMENT

## 2024-08-23 VITALS
HEIGHT: 73 IN | OXYGEN SATURATION: 97 % | BODY MASS INDEX: 29.21 KG/M2 | HEART RATE: 94 BPM | RESPIRATION RATE: 18 BRPM | WEIGHT: 220.38 LBS

## 2024-08-23 DIAGNOSIS — G89.29 CHRONIC LEFT SHOULDER PAIN: ICD-10-CM

## 2024-08-23 DIAGNOSIS — G89.29 CHRONIC ELBOW PAIN, RIGHT: ICD-10-CM

## 2024-08-23 DIAGNOSIS — M25.512 CHRONIC LEFT SHOULDER PAIN: ICD-10-CM

## 2024-08-23 DIAGNOSIS — M25.512 CHRONIC LEFT SHOULDER PAIN: Primary | ICD-10-CM

## 2024-08-23 DIAGNOSIS — M25.521 CHRONIC ELBOW PAIN, RIGHT: ICD-10-CM

## 2024-08-23 DIAGNOSIS — G89.29 CHRONIC LEFT SHOULDER PAIN: Primary | ICD-10-CM

## 2024-08-23 DIAGNOSIS — M77.11 RIGHT LATERAL EPICONDYLITIS: ICD-10-CM

## 2024-08-23 PROCEDURE — 73080 X-RAY EXAM OF ELBOW: CPT | Mod: TC,RT

## 2024-08-23 PROCEDURE — 99999 PR PBB SHADOW E&M-EST. PATIENT-LVL IV: CPT | Mod: PBBFAC,,, | Performed by: PHYSICIAN ASSISTANT

## 2024-08-23 PROCEDURE — 99214 OFFICE O/P EST MOD 30 MIN: CPT | Mod: PBBFAC,25 | Performed by: PHYSICIAN ASSISTANT

## 2024-08-23 RX ORDER — LIDOCAINE HYDROCHLORIDE 10 MG/ML
0.5 INJECTION, SOLUTION INFILTRATION; PERINEURAL
Status: DISCONTINUED | OUTPATIENT
Start: 2024-08-23 | End: 2024-08-23 | Stop reason: HOSPADM

## 2024-08-23 RX ORDER — TRIAMCINOLONE ACETONIDE 40 MG/ML
20 INJECTION, SUSPENSION INTRA-ARTICULAR; INTRAMUSCULAR
Status: DISCONTINUED | OUTPATIENT
Start: 2024-08-23 | End: 2024-08-23 | Stop reason: HOSPADM

## 2024-08-23 RX ADMIN — TRIAMCINOLONE ACETONIDE 20 MG: 40 INJECTION, SUSPENSION INTRA-ARTICULAR; INTRAMUSCULAR at 09:08

## 2024-08-23 RX ADMIN — LIDOCAINE HYDROCHLORIDE 0.5 ML: 10 INJECTION, SOLUTION INFILTRATION; PERINEURAL at 09:08

## 2024-08-23 NOTE — PROGRESS NOTES
Subjective:      Patient ID: Cirilo Blackman is a 43 y.o. male.    Chief Complaint: Pain of the Left Shoulder    Review of patient's allergies indicates:  No Known Allergies   42 yo M presents to clinic with c/o left shoulder pain x years.  He reports injury in [employer] [place], had surgery 2 years ago for torn labrum, has had continued pain since.  He says that shoulder pain has improved some over the past year or so but still bothers hip with activities such as pushups and reaching overhead.  Pain is achy/sharp and located mostly to posterior shoulder.  Improves some with rest.  No swelling, redness, warmth.  No numbness/tingling.  Denies neck pain today.      Also c/o right elbow/forearm pain x 1 year.  No specific injury or trauma.  Sharp pain to lateral elbow that is worse with grasping/lifting things and improves some with rest.  No swelling, redness, warmth.  No numbness/tingling.        Review of Systems   Constitutional: Negative for chills, diaphoresis and fever.   HENT:  Negative for congestion, ear discharge and ear pain.    Eyes:  Negative for blurred vision, discharge, double vision and pain.   Cardiovascular:  Negative for chest pain, claudication and cyanosis.   Respiratory:  Negative for cough, hemoptysis and shortness of breath.    Endocrine: Negative for cold intolerance and heat intolerance.   Skin:  Negative for color change, dry skin, itching and rash.   Musculoskeletal:  Positive for joint pain. Negative for arthritis, back pain, falls, gout, joint swelling, muscle weakness and neck pain.   Gastrointestinal:  Negative for abdominal pain and change in bowel habit.   Neurological:  Negative for brief paralysis, disturbances in coordination, dizziness, numbness and paresthesias.   Psychiatric/Behavioral:  Negative for altered mental status and depression.          Objective:          General    Constitutional: He is oriented to person, place, and time. He appears well-developed and  well-nourished. No distress.   HENT:   Head: Atraumatic.   Eyes: EOM are normal. Right eye exhibits no discharge. Left eye exhibits no discharge.   Cardiovascular:  Normal rate.            Pulmonary/Chest: Effort normal. No respiratory distress.   Abdominal: Soft.   Neurological: He is alert and oriented to person, place, and time.   Psychiatric: He has a normal mood and affect. His behavior is normal.         Right Shoulder Exam     Inspection/Observation   Swelling: absent  Bruising: absent  Scars: absent  Deformity: absent    Range of Motion   Active abduction:  normal   Passive abduction:  normal   Forward Flexion:  normal   Forward Elevation: normal  External Rotation 0 degrees:  normal   Internal rotation 0 degrees:  normal     Tests & Signs   Mcleod test: negative  Impingement: negative  Lift Off Sign: negative  Belly Press: negative    Other   Sensation: normal    Left Shoulder Exam     Inspection/Observation   Swelling: absent  Bruising: absent  Scars: present  Deformity: absent    Range of Motion   Active abduction:  normal   Passive abduction:  normal   Forward Flexion:  abnormal   Forward Elevation: abnormal  External Rotation 0 degrees:  normal   Internal rotation 0 degrees:  normal     Tests & Signs   Mcleod test: negative  Impingement: negative  Lift Off Sign: negative  Belly Press: negative    Other   Sensation: normal       Vascular Exam       Capillary Refill  Right Hand: normal capillary refill  Left Hand: normal capillary refill                  Assessment:         Xray Left Shoulder 8/9/24:  Mild moderate degenerative changes of the acromioclavicular joint with moderate degenerative changes of the glenohumeral joint with joint space narrowing, subchondral sclerosis and subchondral cystic change with large inferior spur projecting from the humeral neck. No fracture or dislocation is seen.     Encounter Diagnoses   Name Primary?    Chronic left shoulder pain Yes    Chronic elbow pain, right      Right lateral epicondylitis     Chronic left shoulder pain  -     Ambulatory referral/consult to Orthopedics  -     MRI Shoulder Without Contrast Left; Future; Expected date: 08/23/2024  -     X-Ray Elbow Complete Right; Future; Expected date: 08/23/2024    Chronic elbow pain, right  -     X-Ray Elbow Complete Right; Future; Expected date: 08/23/2024  -     Intermediate Joint Aspiration/Injection    Right lateral epicondylitis  -     Intermediate Joint Aspiration/Injection               Plan:         The total face-to-face encounter time with this patient was 45 minutes and greater than 50% of of the encounter time was spent counseling the patient, coordinating care, and education regarding the diagnosis. We have discussed a variety of treatment options including medications, injections, physical therapy and other alternative treatments. I also explained the indications, risks and benefits of surgery.   I recommended starting with conservative treatment including PT and/or injection at this time but pt declines. He is requesting shoulder MRI.    Regarding elbow pain, pt would like to proceed with lateral epicondyle injection today as well as home exercise program.    I made the decision to obtain old records of the patient including previous notes and imaging. New imaging was ordered today of the extremity or extremities evaluated.      MRI left shoulder.  Right lateral epicondyle injection - see procedure note.   Ice compress to the affected area 2-3x a day for 15-20 minutes as needed for pain management.  4. Epicondylitis home exercise program. [employer] handout of exercises provided to patient.  5. RTC after MRI for results and follow up, sooner if needed.    Patient voices understanding of and agreement with treatment plan. All of the patient's questions were answered and the patient will contact us if he has any questions or concerns in the interim.

## 2024-08-23 NOTE — PROCEDURES
R lateral epicondyleIntermediate Joint Aspiration/Injection    Date/Time: 8/23/2024 9:30 AM    Performed by: Lilian Nascimento PA-C  Authorized by: Lilian Nascimento PA-C    Consent Done?:  Yes (Verbal)  Indications:  Pain  Timeout: Prior to procedure the correct patient, procedure, and site was verified      Location:  Elbow  Prep: Patient was prepped and draped in usual sterile fashion    Medications:  0.5 mL LIDOcaine HCL 10 mg/ml (1%) 10 mg/mL (1 %); 20 mg triamcinolone acetonide 40 mg/mL  Patient tolerance:  Patient tolerated the procedure well with no immediate complications

## 2024-08-28 NOTE — PROGRESS NOTES
Subjective:      Patient ID: Cirilo Blackman is a 43 y.o. male.    Chief Complaint: Pain of the Left Shoulder    Review of patient's allergies indicates:  No Known Allergies   Pt returns to clinic for follow up of left shoulder pain and MRI results.  No change in shoulder pain.    He does report that elbow pain is improved after injection at last visit.    8/23/24:  44 yo M presents to clinic with c/o left shoulder pain x years.  He reports injury in [employer] in 2018, had surgery 2 years ago for torn labrum, has had continued pain since, was told that he needed shoulder replacement.  He says that shoulder pain has improved some over the past year or so but still bothers hip with activities such as pushups and reaching overhead.  Pain is achy/sharp and located mostly to posterior shoulder.  Improves some with rest.  No swelling, redness, warmth.  No numbness/tingling.  Denies neck pain today.      Also c/o right elbow/forearm pain x 1 year.  No specific injury or trauma.  Sharp pain to lateral elbow that is worse with grasping/lifting things and improves some with rest.  No swelling, redness, warmth.  No numbness/tingling.        Review of Systems   Constitutional: Negative for chills, diaphoresis and fever.   HENT:  Negative for congestion, ear discharge and ear pain.    Eyes:  Negative for blurred vision, discharge, double vision and pain.   Cardiovascular:  Negative for chest pain, claudication and cyanosis.   Respiratory:  Negative for cough, hemoptysis and shortness of breath.    Endocrine: Negative for cold intolerance and heat intolerance.   Skin:  Negative for color change, dry skin, itching and rash.   Musculoskeletal:  Positive for joint pain. Negative for arthritis, back pain, falls, gout, joint swelling, muscle weakness and neck pain.   Gastrointestinal:  Negative for abdominal pain and change in bowel habit.   Neurological:  Negative for brief paralysis, disturbances in coordination, dizziness, numbness  and paresthesias.   Psychiatric/Behavioral:  Negative for altered mental status and depression.          Objective:          General    Constitutional: He is oriented to person, place, and time. He appears well-developed and well-nourished. No distress.   HENT:   Head: Atraumatic.   Eyes: EOM are normal. Right eye exhibits no discharge. Left eye exhibits no discharge.   Cardiovascular:  Normal rate.            Pulmonary/Chest: Effort normal. No respiratory distress.   Abdominal: Soft.   Neurological: He is alert and oriented to person, place, and time.   Psychiatric: He has a normal mood and affect. His behavior is normal.         Right Shoulder Exam     Inspection/Observation   Swelling: absent  Bruising: absent  Scars: absent  Deformity: absent    Range of Motion   Active abduction:  normal   Passive abduction:  normal   Forward Flexion:  normal   Forward Elevation: normal  External Rotation 0 degrees:  normal   Internal rotation 0 degrees:  normal     Tests & Signs   Mcleod test: negative  Impingement: negative  Lift Off Sign: negative  Belly Press: negative    Other   Sensation: normal    Left Shoulder Exam     Inspection/Observation   Swelling: absent  Bruising: absent  Scars: present  Deformity: absent    Range of Motion   Active abduction:  normal   Passive abduction:  normal   Forward Flexion:  abnormal   Forward Elevation: abnormal  External Rotation 0 degrees:  normal   Internal rotation 0 degrees:  normal     Tests & Signs   Mcleod test: negative  Impingement: negative  Lift Off Sign: negative  Belly Press: negative    Other   Sensation: normal       Vascular Exam       Capillary Refill  Right Hand: normal capillary refill  Left Hand: normal capillary refill                  Assessment:         MRI Left Shoulder 8/29/24:  Advanced osteoarthritic changes of the glenohumeral joint with diffuse chondral thinning and chondral loss, subchondral cystic changes with subchondral edema within the glenoid rim and  humeral head along with a large amount of osteophytosis along the humeral neck.     Suspected postoperative changes of prior glenoid labral repair with abnormal morphology of the glenoid labrum posteriorly likely related to chronic tearing with granulation tissue/scarring.  Evaluation for a repeat injury is somewhat limited given the lack of arthrographic contrast material.     Insertional supraspinatus and infraspinatus tendinosis.  No full-thickness rotator cuff tear is seen.     Suspected postoperative changes of biceps tenodesis rather than intra-articular tearing.  Correlation with surgical history is recommended.     Moderate to large glenohumeral joint effusion with synovitis.      Xray Left Shoulder 8/9/24:  Mild moderate degenerative changes of the acromioclavicular joint with moderate degenerative changes of the glenohumeral joint with joint space narrowing, subchondral sclerosis and subchondral cystic change with large inferior spur projecting from the humeral neck. No fracture or dislocation is seen.     Encounter Diagnoses   Name Primary?    Chronic left shoulder pain Yes    Right lateral epicondylitis     Primary osteoarthritis of left shoulder       Chronic left shoulder pain    Right lateral epicondylitis    Primary osteoarthritis of left shoulder                 Plan:         The total face-to-face encounter time with this patient was 20 minutes and greater than 50% of of the encounter time was spent counseling the patient, coordinating care, and education regarding the diagnosis. We have discussed a variety of treatment options including medications, injections, physical therapy and other alternative treatments. I also explained the indications, risks and benefits of surgery. He would like to see shoulder surgeon.    Regarding elbow pain, pt says that this is much improved.    I made the decision to obtain old records of the patient including previous notes and imaging. New imaging was ordered  today of the extremity or extremities evaluated.      Appointment with Dr. Alvarado.   Ice compress to the affected area 2-3x a day for 15-20 minutes as needed for pain management.  3. Continue epicondylitis home exercise program.   4. RTC as scheduled, sooner if needed.    Patient voices understanding of and agreement with treatment plan. All of the patient's questions were answered and the patient will contact us if he has any questions or concerns in the interim.

## 2024-08-29 ENCOUNTER — HOSPITAL ENCOUNTER (OUTPATIENT)
Dept: RADIOLOGY | Facility: HOSPITAL | Age: 44
Discharge: HOME | End: 2024-08-29
Attending: PHYSICIAN ASSISTANT
Payer: OTHER GOVERNMENT

## 2024-08-29 ENCOUNTER — OFFICE VISIT (OUTPATIENT)
Dept: ORTHOPEDICS | Facility: CLINIC | Age: 44
End: 2024-08-29
Payer: OTHER GOVERNMENT

## 2024-08-29 VITALS
WEIGHT: 221 LBS | BODY MASS INDEX: 29.29 KG/M2 | SYSTOLIC BLOOD PRESSURE: 118 MMHG | OXYGEN SATURATION: 99 % | RESPIRATION RATE: 16 BRPM | HEART RATE: 98 BPM | HEIGHT: 73 IN | DIASTOLIC BLOOD PRESSURE: 76 MMHG

## 2024-08-29 DIAGNOSIS — G89.29 CHRONIC LEFT SHOULDER PAIN: ICD-10-CM

## 2024-08-29 DIAGNOSIS — M77.11 RIGHT LATERAL EPICONDYLITIS: ICD-10-CM

## 2024-08-29 DIAGNOSIS — M25.512 CHRONIC LEFT SHOULDER PAIN: Primary | ICD-10-CM

## 2024-08-29 DIAGNOSIS — M25.512 CHRONIC LEFT SHOULDER PAIN: ICD-10-CM

## 2024-08-29 DIAGNOSIS — G89.29 CHRONIC LEFT SHOULDER PAIN: Primary | ICD-10-CM

## 2024-08-29 DIAGNOSIS — M19.012 PRIMARY OSTEOARTHRITIS OF LEFT SHOULDER: ICD-10-CM

## 2024-08-29 PROCEDURE — 73221 MRI JOINT UPR EXTREM W/O DYE: CPT | Mod: TC,LT

## 2024-08-29 PROCEDURE — 99213 OFFICE O/P EST LOW 20 MIN: CPT | Mod: S$PBB,,, | Performed by: PHYSICIAN ASSISTANT

## 2024-08-29 PROCEDURE — 99999 PR PBB SHADOW E&M-EST. PATIENT-LVL III: CPT | Mod: PBBFAC,,, | Performed by: PHYSICIAN ASSISTANT

## 2024-08-29 PROCEDURE — 99213 OFFICE O/P EST LOW 20 MIN: CPT | Mod: PBBFAC,25 | Performed by: PHYSICIAN ASSISTANT

## 2024-08-29 PROCEDURE — 73221 MRI JOINT UPR EXTREM W/O DYE: CPT | Mod: 26,LT,, | Performed by: RADIOLOGY

## 2024-09-25 ENCOUNTER — OFFICE VISIT (OUTPATIENT)
Dept: ORTHOPEDICS | Facility: CLINIC | Age: 44
End: 2024-09-25
Payer: OTHER GOVERNMENT

## 2024-09-25 DIAGNOSIS — M19.019 SHOULDER ARTHRITIS: Primary | ICD-10-CM

## 2024-09-25 PROCEDURE — 99203 OFFICE O/P NEW LOW 30 MIN: CPT | Mod: 25,S$PBB,, | Performed by: ORTHOPAEDIC SURGERY

## 2024-09-25 PROCEDURE — 99999PBSHW PR PBB SHADOW TECHNICAL ONLY FILED TO HB: Mod: PBBFAC,,,

## 2024-09-25 PROCEDURE — 99212 OFFICE O/P EST SF 10 MIN: CPT | Mod: PBBFAC,25 | Performed by: ORTHOPAEDIC SURGERY

## 2024-09-25 PROCEDURE — 20610 DRAIN/INJ JOINT/BURSA W/O US: CPT | Mod: PBBFAC | Performed by: ORTHOPAEDIC SURGERY

## 2024-09-25 PROCEDURE — 99999 PR PBB SHADOW E&M-EST. PATIENT-LVL II: CPT | Mod: PBBFAC,,, | Performed by: ORTHOPAEDIC SURGERY

## 2024-09-25 PROCEDURE — 20610 DRAIN/INJ JOINT/BURSA W/O US: CPT | Mod: S$PBB,LT,, | Performed by: ORTHOPAEDIC SURGERY

## 2024-09-25 RX ORDER — CELECOXIB 200 MG/1
200 CAPSULE ORAL DAILY
Qty: 30 CAPSULE | Refills: 3 | Status: SHIPPED | OUTPATIENT
Start: 2024-09-25 | End: 2025-01-23

## 2024-09-25 RX ORDER — TRIAMCINOLONE ACETONIDE 40 MG/ML
40 INJECTION, SUSPENSION INTRA-ARTICULAR; INTRAMUSCULAR
Status: COMPLETED | OUTPATIENT
Start: 2024-09-25 | End: 2024-09-25

## 2024-09-25 RX ADMIN — TRIAMCINOLONE ACETONIDE 40 MG: 40 INJECTION, SUSPENSION INTRA-ARTICULAR; INTRAMUSCULAR at 04:09

## 2024-09-25 NOTE — PROGRESS NOTES
Subjective:      Patient ID: Cirilo Blackman is a 43 y.o. male.    Chief Complaint: Pain of the Left Shoulder      HPI  Cirilo Blackman is a  43 y.o. male presenting today for evaluation of chronic left shoulder pain.  There was a history of trauma.  Onset of symptoms began several years ago while working in the [employer]  Apparently he has had several instances of trauma to the left shoulder including a history of dislocation   He surgery for the left shoulder several years ago for a labral repair but at that time he was noted to have some degenerative changes   Since the surgery he really has had difficulty using the left shoulder with continued pain and occasional subluxation or dislocation of the shoulder   Symptoms are worse when doing pushups   .      Review of patient's allergies indicates:  No Known Allergies      Current Outpatient Medications   Medication Sig Dispense Refill    buPROPion (WELLBUTRIN SR) 150 MG TBSR 12 hr tablet Take 1 tablet (150 mg total) by mouth 2 (two) times daily. 60 tablet 11    celecoxib (CELEBREX) 200 MG capsule Take 1 capsule (200 mg total) by mouth once daily. 30 capsule 3    finasteride (PROSCAR) 5 mg tablet Take 1 tablet (5 mg total) by mouth once daily. 30 tablet 11    ketoconazole (NIZORAL) 2 % shampoo Apply 1 application  topically 2 (two) times daily. Apply to affected area 120 mL 1    traZODone (DESYREL) 50 MG tablet Take 1 tablet (50 mg total) by mouth every evening. 30 tablet 11     No current facility-administered medications for this visit.       No past medical history on file.    Past Surgical History:   Procedure Laterality Date    ANKLE FRACTURE SURGERY Left     FRACTURE SURGERY Right     tib fib    SHOULDER SURGERY Left     WRIST SURGERY Left        Review of Systems:  ROS    OBJECTIVE:     PHYSICAL EXAM:       Vitals:    09/25/24 1541   PainSc:   2     Well developed, well nourished male in no acute distress  Alert and oriented x 3  HEENT- Normal exam  Lungs- Clear  to auscultation  Heart- Regular rate and rhythm  Abdomen- Soft nontender  Extremity exam- examination left shoulder there is some tenderness posteriorly  Range of motion is limited due to pain   There is some mild posterior instability   Neurologic exam intact   Rotator cuff strength intact       RADIOGRAPHS:  AP lateral x-ray left shoulder show significant arthritic changes of the left shoulder  Inferior spurring is noted off the humeral head   MRI scan shows rotator cuff to be intact but with severe arthritic changes and labral deficiency  Comments: I have personally reviewed the imaging and I agree with the above radiologist's report.    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    PLAN:  I explained the nature of the problem to the patient   Most likely the arthritis that he has his posttraumatic and related to the previous injuries he sustained while on the [employer]   For treatment I have started him on Celebrex 200 mg once a day with food  Also recommended injection  After pause for time-out identified the left shoulder injected with Kenalog 40 mg 2 cc xylocaine sterile technique  Tolerated the procedure well without complication   He has been through physical therapy recently without improvement   The only other option would be a shoulder replacement and I think he is a little young for that so I would like to hold off on that for a while if possible   Follow-up in 2-3 months       - We talked at length about the anatomy and pathophysiology of   Encounter Diagnosis   Name Primary?    Shoulder arthritis Yes           Disclaimer: This note has been generated using voice-recognition software. There may be typographical errors that have been missed during proof-reading.

## 2024-10-02 ENCOUNTER — PATIENT MESSAGE (OUTPATIENT)
Dept: INTERNAL MEDICINE | Facility: CLINIC | Age: 44
End: 2024-10-02
Payer: OTHER GOVERNMENT

## 2024-10-04 DIAGNOSIS — F51.01 PRIMARY INSOMNIA: ICD-10-CM

## 2024-10-04 DIAGNOSIS — Z72.0 NICOTINE USE: ICD-10-CM

## 2024-10-04 RX ORDER — TRAZODONE HYDROCHLORIDE 50 MG/1
50 TABLET ORAL NIGHTLY
Qty: 90 TABLET | Refills: 3 | Status: SHIPPED | OUTPATIENT
Start: 2024-10-04

## 2024-10-04 RX ORDER — FINASTERIDE 5 MG/1
5 TABLET, FILM COATED ORAL
Qty: 90 TABLET | Refills: 3 | Status: SHIPPED | OUTPATIENT
Start: 2024-10-04

## 2024-10-04 RX ORDER — BUPROPION HYDROCHLORIDE 150 MG/1
150 TABLET, EXTENDED RELEASE ORAL 2 TIMES DAILY
Qty: 180 TABLET | Refills: 3 | Status: SHIPPED | OUTPATIENT
Start: 2024-10-04

## 2024-10-04 NOTE — TELEPHONE ENCOUNTER
No care due was identified.  [facility] Embedded Care Due Messages. Reference number: [id number].   10/04/2024 12:09:33 AM CDT

## 2024-10-04 NOTE — TELEPHONE ENCOUNTER
Refill Decision Note   Cirilo Yehuda  is requesting a refill authorization.  Brief Assessment and Rationale for Refill:  Approve     Medication Therapy Plan:         Comments:     Note composed:6:50 PM 10/04/2024

## 2024-10-09 ENCOUNTER — PATIENT MESSAGE (OUTPATIENT)
Dept: ADMINISTRATIVE | Facility: HOSPITAL | Age: 44
End: 2024-10-09
Payer: OTHER GOVERNMENT

## 2024-10-16 ENCOUNTER — HOSPITAL ENCOUNTER (OUTPATIENT)
Dept: PULMONOLOGY | Facility: HOSPITAL | Age: 44
Discharge: HOME | End: 2024-10-16
Attending: INTERNAL MEDICINE
Payer: OTHER GOVERNMENT

## 2024-10-16 ENCOUNTER — OFFICE VISIT (OUTPATIENT)
Dept: INTERNAL MEDICINE | Facility: CLINIC | Age: 44
End: 2024-10-16
Payer: OTHER GOVERNMENT

## 2024-10-16 VITALS
SYSTOLIC BLOOD PRESSURE: 122 MMHG | HEIGHT: 73 IN | BODY MASS INDEX: 28.63 KG/M2 | DIASTOLIC BLOOD PRESSURE: 80 MMHG | RESPIRATION RATE: 20 BRPM | HEART RATE: 102 BPM | OXYGEN SATURATION: 97 % | WEIGHT: 216.06 LBS

## 2024-10-16 DIAGNOSIS — Z72.0 NICOTINE USE: ICD-10-CM

## 2024-10-16 DIAGNOSIS — Z23 NEED FOR VACCINATION: ICD-10-CM

## 2024-10-16 DIAGNOSIS — Z12.11 COLON CANCER SCREENING: ICD-10-CM

## 2024-10-16 DIAGNOSIS — Z01.818 PRE-OP EVALUATION: Primary | ICD-10-CM

## 2024-10-16 DIAGNOSIS — Z01.818 PRE-OP EVALUATION: ICD-10-CM

## 2024-10-16 DIAGNOSIS — Z12.5 PROSTATE CANCER SCREENING: ICD-10-CM

## 2024-10-16 PROCEDURE — G2211 COMPLEX E/M VISIT ADD ON: HCPCS | Mod: S$PBB,,, | Performed by: INTERNAL MEDICINE

## 2024-10-16 PROCEDURE — 99214 OFFICE O/P EST MOD 30 MIN: CPT | Mod: S$PBB,,, | Performed by: INTERNAL MEDICINE

## 2024-10-16 PROCEDURE — 99214 OFFICE O/P EST MOD 30 MIN: CPT | Mod: PBBFAC | Performed by: INTERNAL MEDICINE

## 2024-10-16 PROCEDURE — 90656 IIV3 VACC NO PRSV 0.5 ML IM: CPT | Mod: PBBFAC

## 2024-10-16 PROCEDURE — 99900035 HC TECH TIME PER 15 MIN (STAT)

## 2024-10-16 PROCEDURE — 99999PBSHW INFLUENZA - TRIVALENT - PF (ADULT): Mod: PBBFAC,,,

## 2024-10-16 PROCEDURE — 99999 PR PBB SHADOW E&M-EST. PATIENT-LVL IV: CPT | Mod: PBBFAC,,, | Performed by: INTERNAL MEDICINE

## 2024-10-16 PROCEDURE — 93005 ELECTROCARDIOGRAM TRACING: CPT

## 2024-10-16 NOTE — PROGRESS NOTES
Subjective:       Patient ID: Cirilo Blackman is a 43 y.o. male.    Chief Complaint: Pre-op Exam      HPI:  Patient is known to me and presents for pre-op eval. He is having septoplasty with general anesthesia. He has no h/o CAD, TIA/CVA, DM, CKd. METS > 4. No chest pain, SOB, NAIR. He is not on blood thinners. On celebrex for arthritis/tendinitis pain.  + tobacco use    History reviewed. No pertinent past medical history.    Family History   Problem Relation Name Age of Onset    Cancer Father          prostate    Hypertension Father         Social History     Socioeconomic History    Marital status: Single   Tobacco Use    Smoking status: Every Day     Types: Vaping with nicotine     Start date: 9/9/2023     Passive exposure: Never    Smokeless tobacco: Never   Substance and Sexual Activity    Alcohol use: Yes     Alcohol/week: 3.0 standard drinks of alcohol     Types: 3 Cans of beer per week    Drug use: Never    Sexual activity: Yes     Social Drivers of Health     Transportation Needs: No Transportation Needs (1/23/2024)    PRAPARE - Transportation     Lack of Transportation (Medical): No     Lack of Transportation (Non-Medical): No       Review of Systems   Constitutional:  Negative for activity change, fatigue, fever and unexpected weight change.   HENT:  Negative for congestion, ear pain, hearing loss, rhinorrhea and sore throat.    Eyes:  Negative for redness and visual disturbance.   Respiratory:  Negative for cough, shortness of breath and wheezing.    Cardiovascular:  Negative for chest pain, palpitations and leg swelling.   Gastrointestinal:  Negative for abdominal pain, constipation, diarrhea, nausea and vomiting.   Genitourinary:  Negative for decreased urine volume, dysuria, frequency and urgency.   Musculoskeletal:  Positive for arthralgias. Negative for back pain, joint swelling and neck pain.   Skin:  Negative for color change, rash and wound.   Neurological:  Negative for dizziness, light-headedness  and headaches.         Objective:      Physical Exam  Vitals reviewed.   Constitutional:       General: He is not in acute distress.     Appearance: He is well-developed.   HENT:      Head: Normocephalic and atraumatic.      Right Ear: External ear normal.      Left Ear: External ear normal.   Eyes:      General:         Right eye: No discharge.         Left eye: No discharge.      Conjunctiva/sclera: Conjunctivae normal.   Neck:      Thyroid: No thyromegaly.   Cardiovascular:      Rate and Rhythm: Normal rate and regular rhythm.      Heart sounds: No murmur heard.  Pulmonary:      Effort: Pulmonary effort is normal. No respiratory distress.      Breath sounds: Normal breath sounds. No wheezing or rales.   Abdominal:      General: There is no distension.      Palpations: Abdomen is soft.      Tenderness: There is no abdominal tenderness.   Skin:     General: Skin is warm and dry.   Neurological:      Mental Status: He is alert and oriented to person, place, and time.   Psychiatric:         Behavior: Behavior normal.         Thought Content: Thought content normal.         Assessment:       1. Pre-op evaluation    2. Nicotine use    3. Prostate cancer screening    4. Colon cancer screening    5. Need for vaccination        Plan:       1. Pre-op evaluation    Using Joe Revised Cardiac Risk Index he is low risk for surgery. From a medical standpoint he can proceed with his scheduled surgery pending EKG and labs ordered below.  CAD: no  CHF: no  CVA: no  DM on insulin: no  Cr >2: no  High risk surgery: no  MET >4: yes  Tobacco: yes  EtOH: no  -     EKG 12-lead; Future; Expected date: 10/16/2024  -     CBC Auto Differential; Future; Expected date: 10/16/2024  -     Comprehensive Metabolic Panel; Future; Expected date: 10/16/2024    Will get baseline EKG  Update labs with CBC and CMP. If all normal can proceed with surgery.  Hold ASA/NSAIDs 5 days prior to surgery.  Can continue other medications in the perioperative  period    2. Nicotine use  Chronic  Cessation discussed    3. Prostate cancer screening  Father with h/o metastatic prostate cancer  Screening ordered  -     PSA, Screening; Future; Expected date: 10/16/2024    4. Colon cancer screening  Start screening with C-scope; just turned 43  -     Colonoscopy; Future; Expected date: 10/16/2024    5. Need for vaccination  Done today  -     Influenza - Trivalent - PF (ADULT)

## 2024-10-21 ENCOUNTER — TELEPHONE (OUTPATIENT)
Dept: INTERNAL MEDICINE | Facility: CLINIC | Age: 44
End: 2024-10-21
Payer: OTHER GOVERNMENT

## 2024-10-21 NOTE — TELEPHONE ENCOUNTER
----- Message from Lashae sent at 10/21/2024 12:50 PM CDT -----  Contact: Mandie/jeremy  Cirilo Blackman  MRN: [id number]  : 1980  PCP: Lazara Tirado  Home Phone      [phone]  Work Phone      Not on file.  Mobile          [phone]      MESSAGE:     Mandie mckinley needs sx clearance.         Fax  [phone]   Phone   [phone]

## 2024-11-07 ENCOUNTER — PATIENT MESSAGE (OUTPATIENT)
Dept: INTERNAL MEDICINE | Facility: CLINIC | Age: 44
End: 2024-11-07
Payer: OTHER GOVERNMENT

## 2024-11-14 ENCOUNTER — TELEPHONE (OUTPATIENT)
Dept: INTERNAL MEDICINE | Facility: CLINIC | Age: 44
End: 2024-11-14
Payer: OTHER GOVERNMENT

## 2024-11-14 NOTE — TELEPHONE ENCOUNTER
----- Message from Lashae sent at 2024  3:03 PM CST -----  Contact: Pt  iCrilo Blackman  MRN: [id number]  : 1980  PCP: Lazara Tirado  Home Phone      [phone]  Work Phone      Not on file.  Mobile          [phone]      MESSAGE:     Pt states he has a rash that comes and goes and is wondering if he brielle get some more medication of this sent to [facility] in [place].       Cirilo  [phone]

## 2024-11-15 NOTE — TELEPHONE ENCOUNTER
That's not a medication. Maybe he means fluconazole but I have no prescribed that.    If he has an active rash needs to see me or dermatology. I believe he has seen dermatology in the past for a rash and maybe that is who prescribed this medication he is requesting. I am just not sure what he is asking for so can't refill that.

## 2024-11-18 ENCOUNTER — OFFICE VISIT (OUTPATIENT)
Dept: INTERNAL MEDICINE | Facility: CLINIC | Age: 44
End: 2024-11-18
Payer: OTHER GOVERNMENT

## 2024-11-18 VITALS
WEIGHT: 212.94 LBS | HEIGHT: 73 IN | BODY MASS INDEX: 28.22 KG/M2 | SYSTOLIC BLOOD PRESSURE: 110 MMHG | RESPIRATION RATE: 18 BRPM | DIASTOLIC BLOOD PRESSURE: 74 MMHG | HEART RATE: 77 BPM

## 2024-11-18 DIAGNOSIS — L30.9 DERMATITIS: Primary | ICD-10-CM

## 2024-11-18 PROCEDURE — 99999 PR PBB SHADOW E&M-EST. PATIENT-LVL III: CPT | Mod: PBBFAC,,, | Performed by: NURSE PRACTITIONER

## 2024-11-18 PROCEDURE — 99213 OFFICE O/P EST LOW 20 MIN: CPT | Mod: S$PBB,,, | Performed by: NURSE PRACTITIONER

## 2024-11-18 PROCEDURE — 99213 OFFICE O/P EST LOW 20 MIN: CPT | Mod: PBBFAC | Performed by: NURSE PRACTITIONER

## 2024-11-18 RX ORDER — KETOCONAZOLE 200 MG/1
TABLET ORAL
Qty: 4 TABLET | Refills: 0 | Status: SHIPPED | OUTPATIENT
Start: 2024-11-18

## 2024-11-18 NOTE — PROGRESS NOTES
Subjective:       Patient ID: Cirilo Blackman is a 44 y.o. male.    Chief Complaint: Rash      History of Present Illness    CHIEF COMPLAINT:  Cirilo presents with a rash on his scalp and back that has been spreading.    HPI:  Cirilo reports a rash that originated on his scalp and spread to his back, first noticed approximately 2 weeks ago. The rash on his back manifests as erythematous macules. He has had previous episodes involving widespread distribution across his back, axillary regions, and legs. He has been using Ketoconazole shampoo on his scalp and attempting application to his body, though application has been challenging due to limited resources in his current living situation. The rash persists on his scalp. He has oily skin without associated flaking. Cirilo notes a single lesion on his abdomen. In previous occurrences, oral medication was the only effective treatment, despite concerns about hepatic effects. The treatment regimen involved an initial dose, followed by a second dose 7 days later, preferably administered with a carbonated beverage.    Cirilo denies any current rash on his legs or arms.    MEDICATIONS:  Cirilo is on Trazodone and Celebrex. He is also using Ketoconazole shampoo for scalp and body rash.    MEDICAL HISTORY:  Cirilo has a history of recurrent scalp rash and back rash.      ROS:  Integumentary: +rash, -rash, -dry skin          Objective:      Physical Exam    General: No acute distress. Well-developed. Well-nourished.  Eyes: EOMI. Sclerae anicteric.  HENT: Normocephalic. Atraumatic. Nares patent. Moist oral mucosa.  Ears: Bilateral TMs clear. Bilateral EACs clear.  Cardiovascular: Regular rate. Regular rhythm. No murmurs. No rubs. No gallops. Normal S1, S2.  Respiratory: Normal respiratory effort. Clear to auscultation bilaterally. No rales. No rhonchi. No wheezing.  Abdomen: Soft. Non-tender. Non-distended. Normoactive bowel sounds.  Musculoskeletal: No  obvious  deformity.  Extremities: No lower extremity edema.  Neurological: Alert & oriented x3. No slurred speech. Normal gait.  Psychiatric: Normal mood. Normal affect. Good insight. Good judgment.  Skin: Warm. Dry. Red spots on back.          Assessment:       1. Dermatitis        Plan:     Problem List Items Addressed This Visit    None  Visit Diagnoses       Dermatitis    -  Primary    Relevant Medications    ketoconazole (NIZORAL) 200 mg Tab          Assessment & Plan    Diagnosed tinea based on patient's presentation of spreading red spots on back and sides  Opted for single-dose oral antifungal therapy due to extent of rash and previous patient experience with its efficacy    DERMATOPHYTOSIS (OTHER DERMATOPHYTOSES):  Emphasized the importance of allowing sweat to remain on skin for 6-8 hours after taking oral antifungal medication to improve treatment efficacy.  Cirilo to engage in physical activity to induce sweating after taking the medication.  Cirilo to allow sweat to remain on skin for 6-8 hours before bathing.  Started oral antifungal medication: Take 1 pill with a carbonated beverage (e.g., Coke).  Repeat dose in 7 days.    SEBORRHEIC DERMATITIS OF SCALP (SEBORRHEA CAPITIS):  Continued ketoconazole shampoo for scalp use.    LONG-TERM USE OF NSAIDS:  Continued Celebrex.    OTHER MEDICATIONS:  Continued trazodone.    FOLLOW-UP:  Follow up in 7 days for repeat dose of oral antifungal medication.          This note was generated with the assistance of ambient listening technology. Verbal consent was obtained by the patient and accompanying visitor(s) for the recording of patient appointment to facilitate this note. I attest to having reviewed and edited the generated note for accuracy, though some syntax or spelling errors may persist. Please contact the author of this note for any clarification.

## 2024-11-27 ENCOUNTER — OFFICE VISIT (OUTPATIENT)
Dept: ORTHOPEDICS | Facility: CLINIC | Age: 44
End: 2024-11-27
Payer: OTHER GOVERNMENT

## 2024-11-27 VITALS
BODY MASS INDEX: 27.65 KG/M2 | WEIGHT: 208.63 LBS | HEART RATE: 86 BPM | HEIGHT: 73 IN | RESPIRATION RATE: 18 BRPM | DIASTOLIC BLOOD PRESSURE: 70 MMHG | SYSTOLIC BLOOD PRESSURE: 114 MMHG | OXYGEN SATURATION: 96 %

## 2024-11-27 DIAGNOSIS — G89.29 CHRONIC LEFT SHOULDER PAIN: ICD-10-CM

## 2024-11-27 DIAGNOSIS — M25.512 CHRONIC LEFT SHOULDER PAIN: ICD-10-CM

## 2024-11-27 DIAGNOSIS — M77.11 RIGHT LATERAL EPICONDYLITIS: Primary | ICD-10-CM

## 2024-11-27 PROCEDURE — 99213 OFFICE O/P EST LOW 20 MIN: CPT | Mod: PBBFAC | Performed by: PHYSICIAN ASSISTANT

## 2024-11-27 PROCEDURE — 99999PBSHW PR PBB SHADOW TECHNICAL ONLY FILED TO HB: Mod: PBBFAC,,,

## 2024-11-27 PROCEDURE — 20605 DRAIN/INJ JOINT/BURSA W/O US: CPT | Mod: PBBFAC | Performed by: PHYSICIAN ASSISTANT

## 2024-11-27 PROCEDURE — 99999 PR PBB SHADOW E&M-EST. PATIENT-LVL III: CPT | Mod: PBBFAC,,, | Performed by: PHYSICIAN ASSISTANT

## 2024-11-27 RX ORDER — TRIAMCINOLONE ACETONIDE 40 MG/ML
20 INJECTION, SUSPENSION INTRA-ARTICULAR; INTRAMUSCULAR
Status: DISCONTINUED | OUTPATIENT
Start: 2024-11-27 | End: 2024-11-27 | Stop reason: HOSPADM

## 2024-11-27 RX ADMIN — TRIAMCINOLONE ACETONIDE 20 MG: 40 INJECTION, SUSPENSION INTRA-ARTICULAR; INTRAMUSCULAR at 02:11

## 2024-11-27 NOTE — PROCEDURES
R lateral epicondyleIntermediate Joint Aspiration/Injection: R elbow    Date/Time: 11/27/2024 2:15 PM    Performed by: Lilian Nascimento PA-C  Authorized by: Lilian Nascimento PA-C    Consent Done?:  Yes (Verbal)  Indications:  Pain  Site marked: The procedure site was marked    Timeout: Prior to procedure the correct patient, procedure, and site was verified      Location:  Elbow  Site:  R elbow  Prep: Patient was prepped and draped in usual sterile fashion    Needle size:  25 G  Medications:  20 mg triamcinolone acetonide 40 mg/mL  Patient tolerance:  Patient tolerated the procedure well with no immediate complications

## 2024-11-27 NOTE — PROGRESS NOTES
Subjective:      Patient ID: Cirilo Blackman is a 44 y.o. male.    Chief Complaint: Pain of the Left Shoulder    Review of patient's allergies indicates:  No Known Allergies   Pt returns to clinic for follow up of left shoulder pain.  Last saw Dr. Alvarado about 2 months ago, has had good relief of pain with CSI.    Today he does report that right elbow pain has returned over past few weeks, not as severe as previously.  He is asking to repeat CSI today.      8/29/24:  Pt returns to clinic for follow up of left shoulder pain and MRI results.  No change in shoulder pain.    He does report that elbow pain is improved after injection at last visit.    8/23/24:  44 yo M presents to clinic with c/o left shoulder pain x years.  He reports injury in [employer] in [place], had surgery 2 years ago for torn labrum, has had continued pain since, was told that he needed shoulder replacement.  He says that shoulder pain has improved some over the past year or so but still bothers hip with activities such as pushups and reaching overhead.  Pain is achy/sharp and located mostly to posterior shoulder.  Improves some with rest.  No swelling, redness, warmth.  No numbness/tingling.  Denies neck pain today.      Also c/o right elbow/forearm pain x 1 year.  No specific injury or trauma.  Sharp pain to lateral elbow that is worse with grasping/lifting things and improves some with rest.  No swelling, redness, warmth.  No numbness/tingling.        Review of Systems   Constitutional: Negative for chills, diaphoresis and fever.   HENT:  Negative for congestion, ear discharge and ear pain.    Eyes:  Negative for blurred vision, discharge, double vision and pain.   Cardiovascular:  Negative for chest pain, claudication and cyanosis.   Respiratory:  Negative for cough, hemoptysis and shortness of breath.    Endocrine: Negative for cold intolerance and heat intolerance.   Skin:  Negative for color change, dry skin, itching and rash.    Musculoskeletal:  Positive for joint pain. Negative for arthritis, back pain, falls, gout, joint swelling, muscle weakness and neck pain.   Gastrointestinal:  Negative for abdominal pain and change in bowel habit.   Neurological:  Negative for brief paralysis, disturbances in coordination, dizziness, numbness and paresthesias.   Psychiatric/Behavioral:  Negative for altered mental status and depression.          Objective:          General    Constitutional: He is oriented to person, place, and time. He appears well-developed and well-nourished. No distress.   HENT:   Head: Atraumatic.   Eyes: EOM are normal. Right eye exhibits no discharge. Left eye exhibits no discharge.   Cardiovascular:  Normal rate.            Pulmonary/Chest: Effort normal. No respiratory distress.   Abdominal: Soft.   Neurological: He is alert and oriented to person, place, and time.   Psychiatric: He has a normal mood and affect. His behavior is normal.             Right Hand/Wrist Exam     Inspection   Scars: Wrist - absent   Effusion: Wrist - absent   Bruising: Wrist - absent   Deformity: Wrist - deformity     Range of Motion   The patient has normal right hand/wrist ROM.    Tests   Phalens Sign: negative  Tinel's sign (median nerve): negative  Finkelstein's test: negative      Other     Neuorologic Exam    Median Distribution: normal  Ulnar Distribution: normal  Radial Distribution: normal      Left Hand/Wrist Exam     Inspection   Scars: Wrist - absent   Effusion: Wrist - absent   Bruising: Wrist - absent   Deformity: Wrist - absent     Range of Motion   The patient has normal left hand/wrist ROM.    Tests   Phalens Sign: negative  Tinel's sign (median nerve): negative  Finkelstein's test: negative      Other     Sensory Exam  Median Distribution: normal  Ulnar Distribution: normal  Radial Distribution: normal      Right Elbow Exam     Inspection   Scars: absent  Effusion: absent  Bruising: absent  Deformity: absent    Tenderness    The patient is tender to palpation of the lateral epicondyle.     Range of Motion   The patient has normal right elbow ROM.    Tests   Tinel's sign (cubital tunnel): negative    Other   Sensation: normal      Left Elbow Exam     Inspection   Scars: absent  Effusion: absent  Bruising: absent  Deformity: absent    Range of Motion   The patient has normal left elbow ROM.    Tests   Tinel's sign (cubital tunnel): negative    Other   Sensation: normal    Right Shoulder Exam     Inspection/Observation   Swelling: absent  Bruising: absent  Scars: absent  Deformity: absent    Range of Motion   Active abduction:  normal   Passive abduction:  normal   Forward Flexion:  normal   Forward Elevation: normal  External Rotation 0 degrees:  normal   Internal rotation 0 degrees:  normal     Tests & Signs   Mcleod test: negative  Impingement: negative  Lift Off Sign: negative  Belly Press: negative    Other   Sensation: normal    Left Shoulder Exam     Inspection/Observation   Swelling: absent  Bruising: absent  Scars: present  Deformity: absent    Range of Motion   Active abduction:  normal   Passive abduction:  normal   Forward Flexion:  abnormal   Forward Elevation: abnormal  External Rotation 0 degrees:  normal   Internal rotation 0 degrees:  normal     Tests & Signs   Mcleod test: negative  Impingement: negative  Lift Off Sign: negative  Belly Press: negative    Other   Sensation: normal       Vascular Exam       Capillary Refill  Right Hand: normal capillary refill  Left Hand: normal capillary refill                  Assessment:         MRI Left Shoulder 8/29/24:  Advanced osteoarthritic changes of the glenohumeral joint with diffuse chondral thinning and chondral loss, subchondral cystic changes with subchondral edema within the glenoid rim and humeral head along with a large amount of osteophytosis along the humeral neck.     Suspected postoperative changes of prior glenoid labral repair with abnormal morphology of the  glenoid labrum posteriorly likely related to chronic tearing with granulation tissue/scarring.  Evaluation for a repeat injury is somewhat limited given the lack of arthrographic contrast material.     Insertional supraspinatus and infraspinatus tendinosis.  No full-thickness rotator cuff tear is seen.     Suspected postoperative changes of biceps tenodesis rather than intra-articular tearing.  Correlation with surgical history is recommended.     Moderate to large glenohumeral joint effusion with synovitis.      Xray Left Shoulder 8/9/24:  Mild moderate degenerative changes of the acromioclavicular joint with moderate degenerative changes of the glenohumeral joint with joint space narrowing, subchondral sclerosis and subchondral cystic change with large inferior spur projecting from the humeral neck. No fracture or dislocation is seen.     Encounter Diagnosis   Name Primary?    Right lateral epicondylitis Yes      Right lateral epicondylitis  -     Intermediate Joint Aspiration/Injection: R elbow                 Plan:         The total face-to-face encounter time with this patient was 20 minutes and greater than 50% of of the encounter time was spent counseling the patient, coordinating care, and education regarding the diagnosis. We have discussed a variety of treatment options including medications, injections, physical therapy and other alternative treatments. I also explained the indications, risks and benefits of surgery. He says that he is not having any shoulder pain currently. He may consider repeat CSI when needed.    Regarding elbow pain, he is asking to repeat injection today.    I made the decision to obtain old records of the patient including previous notes and imaging. New imaging was ordered today of the extremity or extremities evaluated.      Right lateral epicondyle injection - see procedure note.   Ice compress to the affected area 2-3x a day for 15-20 minutes as needed for pain management.  3.  Continue epicondylitis/shoulder conditioning home exercise program.   4. RTC as needed.    Patient voices understanding of and agreement with treatment plan. All of the patient's questions were answered and the patient will contact us if he has any questions or concerns in the interim.

## 2025-01-07 ENCOUNTER — TELEPHONE (OUTPATIENT)
Dept: INTERNAL MEDICINE | Facility: CLINIC | Age: 45
End: 2025-01-07

## 2025-01-07 DIAGNOSIS — G47.00 INSOMNIA, UNSPECIFIED TYPE: ICD-10-CM

## 2025-01-07 DIAGNOSIS — R06.83 SNORING: ICD-10-CM

## 2025-01-07 DIAGNOSIS — G47.30 SLEEP APNEA, UNSPECIFIED TYPE: Primary | ICD-10-CM

## 2025-01-07 NOTE — TELEPHONE ENCOUNTER
Sleep study self-scoring questionnaire completed over the phone with pt. Sleep study form faxed and original sent to scan.

## 2025-01-07 NOTE — TELEPHONE ENCOUNTER
----- Message from Lashae sent at 2025  2:08 PM CST -----  Contact: Pt  Cirilo lBackman  MRN: [id number]  : 1980  PCP: Lazara Tirado  Home Phone      [phone]  Work Phone      Not on file.  Mobile          [phone]      MESSAGE:     Pt would like to have a sleep study done. He states he had his deviated septum taken care of and is still having issues.         Please advise   [phone]

## 2025-01-24 ENCOUNTER — TELEPHONE (OUTPATIENT)
Dept: INTERNAL MEDICINE | Facility: CLINIC | Age: 45
End: 2025-01-24

## 2025-01-24 NOTE — TELEPHONE ENCOUNTER
----- Message from Lashae sent at 2025  8:45 AM CST -----  Contact: Pt  Cirilo Blackman  MRN: [id number]  : 1980  PCP: Lazara Tirado  Home Phone      [phone]  Work Phone      Not on file.  Mobile          [phone]      MESSAGE:     Pt would like images of xray for  records.       Please advise   [phone]

## 2025-02-17 ENCOUNTER — TELEPHONE (OUTPATIENT)
Dept: INTERNAL MEDICINE | Facility: CLINIC | Age: 45
End: 2025-02-17

## 2025-02-17 NOTE — TELEPHONE ENCOUNTER
----- Message from Lashae sent at 2/17/2025 10:25 AM CST -----  Contact: Pt  Cirilo BlackmanN: [id number]: 1980PCP: Azeb Tirado Phone      [phone] Phone      Not on file.Mobile          [phone]: Pt states he wants to pay out if pocket for sleep apnea test and is asking if we have the number. Please advise [phone]

## 2025-02-24 ENCOUNTER — DOCUMENTATION ONLY (OUTPATIENT)
Dept: SLEEP MEDICINE | Facility: HOSPITAL | Age: 45
End: 2025-02-24

## 2025-02-25 ENCOUNTER — PATIENT MESSAGE (OUTPATIENT)
Dept: INTERNAL MEDICINE | Facility: CLINIC | Age: 45
End: 2025-02-25

## 2025-02-25 DIAGNOSIS — G47.33 OSA ON CPAP: Primary | ICD-10-CM

## 2025-02-25 NOTE — TELEPHONE ENCOUNTER
Notify patient sleep study shows mild JER. Recommended autopPAP (a type of CPAP). Orders placed. Sent to DME

## 2025-08-06 ENCOUNTER — PATIENT MESSAGE (OUTPATIENT)
Dept: ORTHOPEDICS | Facility: CLINIC | Age: 45
End: 2025-08-06

## 2025-08-06 ENCOUNTER — OFFICE VISIT (OUTPATIENT)
Dept: ORTHOPEDICS | Facility: CLINIC | Age: 45
End: 2025-08-06
Payer: COMMERCIAL

## 2025-08-06 VITALS
RESPIRATION RATE: 16 BRPM | DIASTOLIC BLOOD PRESSURE: 82 MMHG | WEIGHT: 195.81 LBS | HEIGHT: 73 IN | OXYGEN SATURATION: 98 % | BODY MASS INDEX: 25.95 KG/M2 | SYSTOLIC BLOOD PRESSURE: 120 MMHG | HEART RATE: 72 BPM

## 2025-08-06 DIAGNOSIS — M19.012 PRIMARY OSTEOARTHRITIS OF LEFT SHOULDER: ICD-10-CM

## 2025-08-06 DIAGNOSIS — G89.29 CHRONIC LEFT SHOULDER PAIN: Primary | ICD-10-CM

## 2025-08-06 DIAGNOSIS — M25.512 CHRONIC LEFT SHOULDER PAIN: Primary | ICD-10-CM

## 2025-08-06 DIAGNOSIS — M77.11 RIGHT LATERAL EPICONDYLITIS: ICD-10-CM

## 2025-08-06 PROCEDURE — 99213 OFFICE O/P EST LOW 20 MIN: CPT | Mod: 25,S$GLB,, | Performed by: PHYSICIAN ASSISTANT

## 2025-08-06 PROCEDURE — 20605 DRAIN/INJ JOINT/BURSA W/O US: CPT | Mod: 51,XS,RT,S$GLB | Performed by: PHYSICIAN ASSISTANT

## 2025-08-06 PROCEDURE — 1160F RVW MEDS BY RX/DR IN RCRD: CPT | Mod: CPTII,S$GLB,, | Performed by: PHYSICIAN ASSISTANT

## 2025-08-06 PROCEDURE — 3008F BODY MASS INDEX DOCD: CPT | Mod: CPTII,S$GLB,, | Performed by: PHYSICIAN ASSISTANT

## 2025-08-06 PROCEDURE — 3074F SYST BP LT 130 MM HG: CPT | Mod: CPTII,S$GLB,, | Performed by: PHYSICIAN ASSISTANT

## 2025-08-06 PROCEDURE — 99999 PR PBB SHADOW E&M-EST. PATIENT-LVL III: CPT | Mod: PBBFAC,,, | Performed by: PHYSICIAN ASSISTANT

## 2025-08-06 PROCEDURE — 20610 DRAIN/INJ JOINT/BURSA W/O US: CPT | Mod: LT,S$GLB,, | Performed by: PHYSICIAN ASSISTANT

## 2025-08-06 PROCEDURE — 3079F DIAST BP 80-89 MM HG: CPT | Mod: CPTII,S$GLB,, | Performed by: PHYSICIAN ASSISTANT

## 2025-08-06 PROCEDURE — 1159F MED LIST DOCD IN RCRD: CPT | Mod: CPTII,S$GLB,, | Performed by: PHYSICIAN ASSISTANT

## 2025-08-06 RX ORDER — FINASTERIDE 1 MG/1
TABLET, FILM COATED ORAL
COMMUNITY
Start: 2015-08-05

## 2025-08-06 RX ORDER — TRIAMCINOLONE ACETONIDE 40 MG/ML
40 INJECTION, SUSPENSION INTRA-ARTICULAR; INTRAMUSCULAR ONCE
Status: COMPLETED | OUTPATIENT
Start: 2025-08-06 | End: 2025-08-06

## 2025-08-06 RX ORDER — LIDOCAINE HYDROCHLORIDE 10 MG/ML
0.5 INJECTION, SOLUTION INFILTRATION; PERINEURAL
Status: DISCONTINUED | OUTPATIENT
Start: 2025-08-06 | End: 2025-08-06 | Stop reason: HOSPADM

## 2025-08-06 RX ORDER — TRIAMCINOLONE ACETONIDE 40 MG/ML
20 INJECTION, SUSPENSION INTRA-ARTICULAR; INTRAMUSCULAR ONCE
Status: COMPLETED | OUTPATIENT
Start: 2025-08-06 | End: 2025-08-06

## 2025-08-06 RX ADMIN — LIDOCAINE HYDROCHLORIDE 0.5 ML: 10 INJECTION, SOLUTION INFILTRATION; PERINEURAL at 01:08

## 2025-08-06 RX ADMIN — TRIAMCINOLONE ACETONIDE 40 MG: 40 INJECTION, SUSPENSION INTRA-ARTICULAR; INTRAMUSCULAR at 02:08

## 2025-08-06 RX ADMIN — TRIAMCINOLONE ACETONIDE 20 MG: 40 INJECTION, SUSPENSION INTRA-ARTICULAR; INTRAMUSCULAR at 01:08

## 2025-08-06 NOTE — PROCEDURES
R lateral epicondyleIntermediate Joint Aspiration/Injection: R elbow    Date/Time: 8/6/2025 1:30 PM    Performed by: Lilian Nascimento PA-C  Authorized by: Lilian Nascimento PA-C    Site marked: The procedure site was marked    Timeout: Prior to procedure the correct patient, procedure, and site was verified      Location:  Elbow    Local anesthesia used?: Yes    Local anesthetic:  Topical anesthetic  Site:  R elbow  Ultrasonic Guidance for needle placement: No  Needle size:  25 G  Medications:  20 mg Kenalog 40mg; 0.5 mL LIDOcaine HCL 10 mg/ml (1%) 10 mg/mL (1 %)  Patient tolerance:  Patient tolerated the procedure well with no immediate complications

## 2025-08-06 NOTE — PROGRESS NOTES
Subjective:      Patient ID: Cirilo Blackman is a 44 y.o. male.    Chief Complaint: Pain of the Right Elbow and Pain of the Left Shoulder    Review of patient's allergies indicates:  No Known Allergies   Pt returns to clinic with c/o left shoulder and right elbow pain.  Similar to previous pain.  Injections have provided good relief in the past.  He is requesting to repeat shoulder and elbow CSI today.    11/27/24:  Pt returns to clinic for follow up of left shoulder pain.  Last saw Dr. Alvarado about 2 months ago, has had good relief of pain with CSI.    Today he does report that right elbow pain has returned over past few weeks, not as severe as previously.  He is asking to repeat CSI today.      8/29/24:  Pt returns to clinic for follow up of left shoulder pain and MRI results.  No change in shoulder pain.    He does report that elbow pain is improved after injection at last visit.    8/23/24:  44 yo M presents to clinic with c/o left shoulder pain x years.  He reports injury in [employer] in [place], had surgery 2 years ago for torn labrum, has had continued pain since, was told that he needed shoulder replacement.  He says that shoulder pain has improved some over the past year or so but still bothers hip with activities such as pushups and reaching overhead.  Pain is achy/sharp and located mostly to posterior shoulder.  Improves some with rest.  No swelling, redness, warmth.  No numbness/tingling.  Denies neck pain today.      Also c/o right elbow/forearm pain x 1 year.  No specific injury or trauma.  Sharp pain to lateral elbow that is worse with grasping/lifting things and improves some with rest.  No swelling, redness, warmth.  No numbness/tingling.        Review of Systems   Constitutional: Negative for chills, diaphoresis and fever.   HENT:  Negative for congestion, ear discharge and ear pain.    Eyes:  Negative for blurred vision, discharge, double vision and pain.   Cardiovascular:  Negative for chest pain,  claudication and cyanosis.   Respiratory:  Negative for cough, hemoptysis and shortness of breath.    Endocrine: Negative for cold intolerance and heat intolerance.   Skin:  Negative for color change, dry skin, itching and rash.   Musculoskeletal:  Positive for joint pain. Negative for arthritis, back pain, falls, gout, joint swelling, muscle weakness and neck pain.   Gastrointestinal:  Negative for abdominal pain and change in bowel habit.   Neurological:  Negative for brief paralysis, disturbances in coordination, dizziness, numbness and paresthesias.   Psychiatric/Behavioral:  Negative for altered mental status and depression.          Objective:          General    Constitutional: He is oriented to person, place, and time. He appears well-developed and well-nourished. No distress.   HENT:   Head: Atraumatic.   Eyes: EOM are normal. Right eye exhibits no discharge. Left eye exhibits no discharge.   Cardiovascular:  Normal rate.            Pulmonary/Chest: Effort normal. No respiratory distress.   Abdominal: Soft.   Neurological: He is alert and oriented to person, place, and time.   Psychiatric: He has a normal mood and affect. His behavior is normal.             Right Hand/Wrist Exam     Inspection   Scars: Wrist - absent   Effusion: Wrist - absent   Bruising: Wrist - absent   Deformity: Wrist - deformity     Range of Motion   The patient has normal right hand/wrist ROM.    Tests   Phalens Sign: negative  Tinel's sign (median nerve): negative  Finkelstein's test: negative      Other     Neuorologic Exam    Median Distribution: normal  Ulnar Distribution: normal  Radial Distribution: normal      Left Hand/Wrist Exam     Inspection   Scars: Wrist - absent   Effusion: Wrist - absent   Bruising: Wrist - absent   Deformity: Wrist - absent     Range of Motion   The patient has normal left hand/wrist ROM.    Tests   Phalens Sign: negative  Tinel's sign (median nerve): negative  Finkelstein's test: negative      Other      Sensory Exam  Median Distribution: normal  Ulnar Distribution: normal  Radial Distribution: normal      Right Elbow Exam     Inspection   Scars: absent  Effusion: absent  Bruising: absent  Deformity: absent    Tenderness   The patient is tender to palpation of the lateral epicondyle.     Range of Motion   The patient has normal right elbow ROM.    Tests   Tinel's sign (cubital tunnel): negative    Other   Sensation: normal      Left Elbow Exam     Inspection   Scars: absent  Effusion: absent  Bruising: absent  Deformity: absent    Range of Motion   The patient has normal left elbow ROM.    Tests   Tinel's sign (cubital tunnel): negative    Other   Sensation: normal    Right Shoulder Exam     Inspection/Observation   Swelling: absent  Bruising: absent  Scars: absent  Deformity: absent    Range of Motion   Active abduction:  normal   Passive abduction:  normal   Forward Flexion:  normal   Forward Elevation: normal  External Rotation 0 degrees:  normal   Internal rotation 0 degrees:  normal     Tests & Signs   Mcleod test: negative  Impingement: negative  Lift Off Sign: negative  Belly Press: negative    Other   Sensation: normal    Left Shoulder Exam     Inspection/Observation   Swelling: absent  Bruising: absent  Scars: present  Deformity: absent    Range of Motion   Active abduction:  normal   Passive abduction:  normal   Forward Flexion:  abnormal   Forward Elevation: abnormal  External Rotation 0 degrees:  normal   Internal rotation 0 degrees:  normal     Tests & Signs   Mcleod test: negative  Impingement: negative  Lift Off Sign: negative  Belly Press: negative    Other   Sensation: normal       Vascular Exam       Capillary Refill  Right Hand: normal capillary refill  Left Hand: normal capillary refill                  Assessment:         MRI Left Shoulder 8/29/24:  Advanced osteoarthritic changes of the glenohumeral joint with diffuse chondral thinning and chondral loss, subchondral cystic changes with  subchondral edema within the glenoid rim and humeral head along with a large amount of osteophytosis along the humeral neck.     Suspected postoperative changes of prior glenoid labral repair with abnormal morphology of the glenoid labrum posteriorly likely related to chronic tearing with granulation tissue/scarring.  Evaluation for a repeat injury is somewhat limited given the lack of arthrographic contrast material.     Insertional supraspinatus and infraspinatus tendinosis.  No full-thickness rotator cuff tear is seen.     Suspected postoperative changes of biceps tenodesis rather than intra-articular tearing.  Correlation with surgical history is recommended.     Moderate to large glenohumeral joint effusion with synovitis.      Xray Left Shoulder 8/9/24:  Mild moderate degenerative changes of the acromioclavicular joint with moderate degenerative changes of the glenohumeral joint with joint space narrowing, subchondral sclerosis and subchondral cystic change with large inferior spur projecting from the humeral neck. No fracture or dislocation is seen.     No diagnosis found.     There are no diagnoses linked to this encounter.               Plan:         The total face-to-face encounter time with this patient was 20 minutes and greater than 50% of of the encounter time was spent counseling the patient, coordinating care, and education regarding the diagnosis. We have discussed a variety of treatment options including medications, injections, physical therapy and other alternative treatments. I also explained the indications, risks and benefits of surgery. He says that he is not having any shoulder pain currently. He may consider repeat CSI when needed.    Regarding elbow pain, he is asking to repeat injection today.    I made the decision to obtain old records of the patient including previous notes and imaging. New imaging was ordered today of the extremity or extremities evaluated.      1. Injection  Procedure  A time out was performed, including verification of patient ID, procedure, site and side, availability of information and equipment, review of safety issues, and agreement with consent, the procedure site was marked.    After time out was performed, the patient was prepped aseptically with chloraprep swabstick. A diagnostic and therapeutic injection of 1:3cc Kenalog/Marcaine was given under sterile technique using a 22g x 1.5 needle from the Posterior  aspect of the left Subacromial in the sitting position.      Pt had no adverse reactions to the medication. Pain decreased. He was instructed to apply ice to the joint for 20 minutes and avoid strenuous activities for 24-36 hours following the injection. He was warned of possible blood sugar and/or blood pressure changes during that time. Following that time, he can resume regular activities.    He was reminded to call the clinic immediately for any adverse side effects as explained in clinic today.    2. Right lateral epicondyle injection - see procedure note.  3.  Ice compress to the affected area 2-3x a day for 15-20 minutes as needed for pain management.  4. Elbow strap as needed.  5. Continue epicondylitis/shoulder conditioning home exercise program.   6. RTC as needed.    Patient voices understanding of and agreement with treatment plan. All of the patient's questions were answered and the patient will contact us if he has any questions or concerns in the interim.

## 2025-08-12 ENCOUNTER — PATIENT MESSAGE (OUTPATIENT)
Dept: INTERNAL MEDICINE | Facility: CLINIC | Age: 45
End: 2025-08-12
Payer: COMMERCIAL

## 2025-08-12 ENCOUNTER — TELEPHONE (OUTPATIENT)
Dept: ORTHOPEDICS | Facility: CLINIC | Age: 45
End: 2025-08-12
Payer: COMMERCIAL

## 2025-08-13 ENCOUNTER — PATIENT MESSAGE (OUTPATIENT)
Dept: INTERNAL MEDICINE | Facility: CLINIC | Age: 45
End: 2025-08-13
Payer: COMMERCIAL

## 2025-08-27 ENCOUNTER — PATIENT MESSAGE (OUTPATIENT)
Dept: INTERNAL MEDICINE | Facility: CLINIC | Age: 45
End: 2025-08-27
Payer: COMMERCIAL